# Patient Record
Sex: MALE | Race: WHITE | NOT HISPANIC OR LATINO | Employment: OTHER | URBAN - METROPOLITAN AREA
[De-identification: names, ages, dates, MRNs, and addresses within clinical notes are randomized per-mention and may not be internally consistent; named-entity substitution may affect disease eponyms.]

---

## 2018-09-30 ENCOUNTER — HOSPITAL ENCOUNTER (OUTPATIENT)
Dept: URGENT CARE | Facility: CLINIC | Age: 63
Discharge: HOME | End: 2018-09-30
Attending: EMERGENCY MEDICINE | Admitting: EMERGENCY MEDICINE

## 2018-09-30 ENCOUNTER — CONVERSION ENCOUNTER (OUTPATIENT)
Dept: URGENT CARE | Facility: CLINIC | Age: 63
End: 2018-09-30

## 2019-06-03 VITALS
BODY MASS INDEX: 33.99 KG/M2 | SYSTOLIC BLOOD PRESSURE: 161 MMHG | DIASTOLIC BLOOD PRESSURE: 93 MMHG | WEIGHT: 237.4 LBS | HEIGHT: 70 IN | HEART RATE: 60 BPM | RESPIRATION RATE: 2 BRPM | OXYGEN SATURATION: 98 %

## 2019-11-11 ENCOUNTER — HOSPITAL ENCOUNTER (EMERGENCY)
Facility: HOSPITAL | Age: 64
Discharge: HOME | End: 2019-11-11
Attending: EMERGENCY MEDICINE | Admitting: EMERGENCY MEDICINE

## 2019-11-11 VITALS
BODY MASS INDEX: 33.05 KG/M2 | SYSTOLIC BLOOD PRESSURE: 145 MMHG | RESPIRATION RATE: 16 BRPM | WEIGHT: 230.82 LBS | HEIGHT: 70 IN | TEMPERATURE: 98.1 F | OXYGEN SATURATION: 100 % | DIASTOLIC BLOOD PRESSURE: 85 MMHG | HEART RATE: 60 BPM

## 2019-11-11 DIAGNOSIS — L03.116 LEFT LEG CELLULITIS: Primary | ICD-10-CM

## 2019-11-11 LAB
ANION GAP SERPL CALCULATED.3IONS-SCNC: 9 MMOL/L (ref 5–15)
BASOPHILS # BLD AUTO: 0 10*3/MM3 (ref 0–0.2)
BASOPHILS NFR BLD AUTO: 0.6 % (ref 0–1.5)
BUN BLD-MCNC: 19 MG/DL (ref 8–23)
BUN/CREAT SERPL: 16.5 (ref 7–25)
CALCIUM SPEC-SCNC: 8.5 MG/DL (ref 8.6–10.5)
CHLORIDE SERPL-SCNC: 103 MMOL/L (ref 98–107)
CO2 SERPL-SCNC: 30 MMOL/L (ref 22–29)
CREAT BLD-MCNC: 1.15 MG/DL (ref 0.76–1.27)
CRP SERPL-MCNC: 1.55 MG/DL (ref 0–0.5)
D DIMER PPP FEU-MCNC: 0.37 MCGFEU/ML (ref 0.17–0.59)
DEPRECATED RDW RBC AUTO: 51.6 FL (ref 37–54)
EOSINOPHIL # BLD AUTO: 0.1 10*3/MM3 (ref 0–0.4)
EOSINOPHIL NFR BLD AUTO: 1.2 % (ref 0.3–6.2)
ERYTHROCYTE [DISTWIDTH] IN BLOOD BY AUTOMATED COUNT: 13.9 % (ref 12.3–15.4)
ERYTHROCYTE [SEDIMENTATION RATE] IN BLOOD: 14 MM/HR (ref 0–20)
GFR SERPL CREATININE-BSD FRML MDRD: 64 ML/MIN/1.73
GLUCOSE BLD-MCNC: 116 MG/DL (ref 65–99)
HCT VFR BLD AUTO: 39 % (ref 37.5–51)
HGB BLD-MCNC: 13.8 G/DL (ref 13–17.7)
HOLD SPECIMEN: NORMAL
LYMPHOCYTES # BLD AUTO: 2.4 10*3/MM3 (ref 0.7–3.1)
LYMPHOCYTES NFR BLD AUTO: 39.4 % (ref 19.6–45.3)
MCH RBC QN AUTO: 35.8 PG (ref 26.6–33)
MCHC RBC AUTO-ENTMCNC: 35.4 G/DL (ref 31.5–35.7)
MCV RBC AUTO: 101.1 FL (ref 79–97)
MONOCYTES # BLD AUTO: 0.7 10*3/MM3 (ref 0.1–0.9)
MONOCYTES NFR BLD AUTO: 11.5 % (ref 5–12)
NEUTROPHILS # BLD AUTO: 2.9 10*3/MM3 (ref 1.7–7)
NEUTROPHILS NFR BLD AUTO: 47.3 % (ref 42.7–76)
NRBC BLD AUTO-RTO: 0 /100 WBC (ref 0–0.2)
PLATELET # BLD AUTO: 150 10*3/MM3 (ref 140–450)
PMV BLD AUTO: 8.7 FL (ref 6–12)
POTASSIUM BLD-SCNC: 4.5 MMOL/L (ref 3.5–5.2)
RBC # BLD AUTO: 3.86 10*6/MM3 (ref 4.14–5.8)
SODIUM BLD-SCNC: 142 MMOL/L (ref 136–145)
WBC NRBC COR # BLD: 6 10*3/MM3 (ref 3.4–10.8)

## 2019-11-11 PROCEDURE — 80048 BASIC METABOLIC PNL TOTAL CA: CPT | Performed by: EMERGENCY MEDICINE

## 2019-11-11 PROCEDURE — 25010000002 CEFTRIAXONE PER 250 MG: Performed by: EMERGENCY MEDICINE

## 2019-11-11 PROCEDURE — 99283 EMERGENCY DEPT VISIT LOW MDM: CPT

## 2019-11-11 PROCEDURE — 86140 C-REACTIVE PROTEIN: CPT | Performed by: EMERGENCY MEDICINE

## 2019-11-11 PROCEDURE — 96374 THER/PROPH/DIAG INJ IV PUSH: CPT

## 2019-11-11 PROCEDURE — 85025 COMPLETE CBC W/AUTO DIFF WBC: CPT | Performed by: EMERGENCY MEDICINE

## 2019-11-11 PROCEDURE — 85379 FIBRIN DEGRADATION QUANT: CPT | Performed by: EMERGENCY MEDICINE

## 2019-11-11 PROCEDURE — 85652 RBC SED RATE AUTOMATED: CPT | Performed by: EMERGENCY MEDICINE

## 2019-11-11 RX ORDER — ATORVASTATIN CALCIUM 20 MG/1
10 TABLET, FILM COATED ORAL DAILY
COMMUNITY
Start: 2019-08-23

## 2019-11-11 RX ORDER — LISINOPRIL 10 MG/1
10 TABLET ORAL DAILY
COMMUNITY
Start: 2019-08-23

## 2019-11-11 RX ORDER — SILDENAFIL 100 MG/1
100 TABLET, FILM COATED ORAL
COMMUNITY
Start: 2019-08-23

## 2019-11-11 RX ORDER — CEPHALEXIN 500 MG/1
500 CAPSULE ORAL 4 TIMES DAILY
Qty: 40 CAPSULE | Refills: 0 | Status: SHIPPED | OUTPATIENT
Start: 2019-11-11

## 2019-11-11 RX ORDER — ASPIRIN 81 MG/1
81 TABLET ORAL DAILY
COMMUNITY
Start: 2015-04-21

## 2019-11-11 RX ORDER — METOPROLOL SUCCINATE 100 MG/1
100 TABLET, EXTENDED RELEASE ORAL DAILY
COMMUNITY
Start: 2019-08-23

## 2019-11-11 RX ORDER — SODIUM CHLORIDE 0.9 % (FLUSH) 0.9 %
10 SYRINGE (ML) INJECTION AS NEEDED
Status: DISCONTINUED | OUTPATIENT
Start: 2019-11-11 | End: 2019-11-12 | Stop reason: HOSPADM

## 2019-11-11 RX ADMIN — CEFTRIAXONE SODIUM 1 G: 10 INJECTION, POWDER, FOR SOLUTION INTRAVENOUS at 23:34

## 2019-11-12 NOTE — ED NOTES
Patient presents to ED with complaints of LLE edema with taut edema noted in calf. Complaints of pain in left knee. Recent international travel. Returned home mid October.      Dayanna Shelton RN  11/11/19 4744

## 2019-11-12 NOTE — ED PROVIDER NOTES
Subjective   History of Present Illness  Left leg pain  64-year-old male planes of pain redness on the anterior aspect of his left leg that started in the area of his tibial tuberosity and progressed involving the entire lower leg.  He states he had a similar issue a month ago when he was overseas that resolved spontaneously but this time it became more painful and swollen.  He reports no chest pain or shortness of breath or fevers or chills or vomiting.  He reports no trauma  Review of Systems   Constitutional: Negative.    HENT: Negative.    Respiratory: Negative.    Cardiovascular: Negative.    Gastrointestinal: Negative.    Genitourinary: Negative.    Musculoskeletal: Negative.    Skin: Positive for color change.   Neurological: Negative.    Hematological: Negative.    Psychiatric/Behavioral: Negative.        Past Medical History:   Diagnosis Date   • Acute ixjzf-xwdnty-zjtk disease (CMS/HCC)    • Cancer (CMS/HCC)    • H/O acute respiratory failure    • Hepatitis    • Hypertension    • Immune deficiency disorder (CMS/HCC)    • Kidney stone    • Macular degeneration    • Multi-organ failure with heart failure (CMS/HCC)     history   • Non Hodgkin's lymphoma (CMS/HCC)    • Radiation necrosis of skin and subcutaneous        No Known Allergies    Past Surgical History:   Procedure Laterality Date   • BONE MARROW TRANSPLANT     • CHOLECYSTECTOMY     • KNEE ASPIRATION     • SMALL INTESTINE SURGERY     • TOTAL SHOULDER REPLACEMENT         History reviewed. No pertinent family history.    Social History     Socioeconomic History   • Marital status:      Spouse name: Not on file   • Number of children: Not on file   • Years of education: Not on file   • Highest education level: Not on file   Tobacco Use   • Smoking status: Never Smoker   • Smokeless tobacco: Never Used   Substance and Sexual Activity   • Alcohol use: Yes     Comment: daily   • Drug use: No   • Sexual activity: Defer           Objective   Physical  "Exam  /93   Pulse 54   Temp 98.4 °F (36.9 °C)   Resp 16   Ht 177.8 cm (70\")   Wt 105 kg (230 lb 13.2 oz)   SpO2 100%   BMI 33.12 kg/m²   General: Well-developed well-appearing, no acute distress, alert and appropriate  Eyes: Pupils round and reactive, sclera nonicteric  HEENT: Mucous membranes moist, no mucosal swelling  Neck: Supple, no nuchal rigidity, no lymphadenopathy  Respirations: Respirations nonlabored, equal breath sounds bilaterally, clear lungs  Heart regular rate and rhythm, no murmurs rubs or gallops,   Abdomen soft nontender nondistended, no hepatosplenomegaly, no hernia, no mass, normal bowel sounds, no CVA tenderness  Extremities there is some erythema and localized tenderness over the anterior aspect of the leg, there is no fluctuance or induration, the knee and ankle joints are not involved, there is no knee joint effusion he has normal pulses and sensorimotor function distally, the posterior calf is nontender  Neuro cranial nerves grossly intact, no focal limb deficits  Psych oriented, pleasant affect  Skin no rash, brisk cap refill  Procedures           ED Course      Results for orders placed or performed during the hospital encounter of 11/11/19   Basic Metabolic Panel   Result Value Ref Range    Glucose 116 (H) 65 - 99 mg/dL    BUN 19 8 - 23 mg/dL    Creatinine 1.15 0.76 - 1.27 mg/dL    Sodium 142 136 - 145 mmol/L    Potassium 4.5 3.5 - 5.2 mmol/L    Chloride 103 98 - 107 mmol/L    CO2 30.0 (H) 22.0 - 29.0 mmol/L    Calcium 8.5 (L) 8.6 - 10.5 mg/dL    eGFR Non African Amer 64 >60 mL/min/1.73    BUN/Creatinine Ratio 16.5 7.0 - 25.0    Anion Gap 9.0 5.0 - 15.0 mmol/L   D-dimer, Quantitative   Result Value Ref Range    D-Dimer, Quantitative 0.37 0.17 - 0.59 MCGFEU/mL   C-reactive Protein   Result Value Ref Range    C-Reactive Protein 1.55 (H) 0.00 - 0.50 mg/dL   CBC Auto Differential   Result Value Ref Range    WBC 6.00 3.40 - 10.80 10*3/mm3    RBC 3.86 (L) 4.14 - 5.80 10*6/mm3    " Hemoglobin 13.8 13.0 - 17.7 g/dL    Hematocrit 39.0 37.5 - 51.0 %    .1 (H) 79.0 - 97.0 fL    MCH 35.8 (H) 26.6 - 33.0 pg    MCHC 35.4 31.5 - 35.7 g/dL    RDW 13.9 12.3 - 15.4 %    RDW-SD 51.6 37.0 - 54.0 fl    MPV 8.7 6.0 - 12.0 fL    Platelets 150 140 - 450 10*3/mm3    Neutrophil % 47.3 42.7 - 76.0 %    Lymphocyte % 39.4 19.6 - 45.3 %    Monocyte % 11.5 5.0 - 12.0 %    Eosinophil % 1.2 0.3 - 6.2 %    Basophil % 0.6 0.0 - 1.5 %    Neutrophils, Absolute 2.90 1.70 - 7.00 10*3/mm3    Lymphocytes, Absolute 2.40 0.70 - 3.10 10*3/mm3    Monocytes, Absolute 0.70 0.10 - 0.90 10*3/mm3    Eosinophils, Absolute 0.10 0.00 - 0.40 10*3/mm3    Basophils, Absolute 0.00 0.00 - 0.20 10*3/mm3    nRBC 0.0 0.0 - 0.2 /100 WBC                 MDM  Patient has findings of left leg cellulitis.  He has not been on any antibiotics and was given IV Rocephin in the emergency room course.  He is afebrile he is not septic.  He has no leukocytosis.  D-dimer screen is normal.  He was ordered Keflex and discharged to follow-up with his doctor over the next couple of days they were given warning signs for return and discharge good condition.  Final diagnoses:   Left leg cellulitis              Christian Cook MD  11/11/19 2260

## 2023-05-26 ENCOUNTER — APPOINTMENT (OUTPATIENT)
Dept: CT IMAGING | Facility: HOSPITAL | Age: 68
End: 2023-05-26
Payer: MEDICARE

## 2023-05-26 ENCOUNTER — HOSPITAL ENCOUNTER (OUTPATIENT)
Facility: HOSPITAL | Age: 68
Setting detail: OBSERVATION
Discharge: HOME | End: 2023-05-28
Attending: EMERGENCY MEDICINE | Admitting: STUDENT IN AN ORGANIZED HEALTH CARE EDUCATION/TRAINING PROGRAM
Payer: MEDICARE

## 2023-05-26 ENCOUNTER — APPOINTMENT (OUTPATIENT)
Dept: GENERAL RADIOLOGY | Facility: HOSPITAL | Age: 68
End: 2023-05-26
Payer: MEDICARE

## 2023-05-26 DIAGNOSIS — I71.21 AORTIC ROOT ANEURYSM: ICD-10-CM

## 2023-05-26 DIAGNOSIS — G45.9 TRANSIENT ISCHEMIC ATTACK: Primary | ICD-10-CM

## 2023-05-26 LAB
ABO GROUP BLD: NORMAL
ALBUMIN SERPL-MCNC: 4.1 G/DL (ref 3.5–5.2)
ALBUMIN/GLOB SERPL: 1.6 G/DL
ALP SERPL-CCNC: 87 U/L (ref 39–117)
ALT SERPL W P-5'-P-CCNC: 21 U/L (ref 1–41)
AMMONIA BLD-SCNC: 18 UMOL/L (ref 16–60)
ANION GAP SERPL CALCULATED.3IONS-SCNC: 12 MMOL/L (ref 5–15)
APTT PPP: 26.1 SECONDS (ref 24–31)
AST SERPL-CCNC: 36 U/L (ref 1–40)
BASOPHILS # BLD AUTO: 0 10*3/MM3 (ref 0–0.2)
BASOPHILS NFR BLD AUTO: 0.5 % (ref 0–1.5)
BILIRUB SERPL-MCNC: 0.6 MG/DL (ref 0–1.2)
BLD GP AB SCN SERPL QL: NEGATIVE
BUN SERPL-MCNC: 22 MG/DL (ref 8–23)
BUN/CREAT SERPL: 18.3 (ref 7–25)
CALCIUM SPEC-SCNC: 8.8 MG/DL (ref 8.6–10.5)
CHLORIDE SERPL-SCNC: 104 MMOL/L (ref 98–107)
CO2 SERPL-SCNC: 24 MMOL/L (ref 22–29)
CREAT SERPL-MCNC: 1.2 MG/DL (ref 0.76–1.27)
DEPRECATED RDW RBC AUTO: 50.8 FL (ref 37–54)
EGFRCR SERPLBLD CKD-EPI 2021: 65.9 ML/MIN/1.73
EOSINOPHIL # BLD AUTO: 0.1 10*3/MM3 (ref 0–0.4)
EOSINOPHIL NFR BLD AUTO: 1.1 % (ref 0.3–6.2)
ERYTHROCYTE [DISTWIDTH] IN BLOOD BY AUTOMATED COUNT: 14.2 % (ref 12.3–15.4)
ETHANOL UR QL: 0.05 %
GLOBULIN UR ELPH-MCNC: 2.5 GM/DL
GLUCOSE BLDC GLUCOMTR-MCNC: 151 MG/DL (ref 70–105)
GLUCOSE SERPL-MCNC: 86 MG/DL (ref 65–99)
HCT VFR BLD AUTO: 46.4 % (ref 37.5–51)
HGB BLD-MCNC: 15.1 G/DL (ref 13–17.7)
HOLD SPECIMEN: NORMAL
INR PPP: 1 (ref 0.93–1.1)
LYMPHOCYTES # BLD AUTO: 2.6 10*3/MM3 (ref 0.7–3.1)
LYMPHOCYTES NFR BLD AUTO: 39 % (ref 19.6–45.3)
MCH RBC QN AUTO: 33.7 PG (ref 26.6–33)
MCHC RBC AUTO-ENTMCNC: 32.5 G/DL (ref 31.5–35.7)
MCV RBC AUTO: 103.5 FL (ref 79–97)
MONOCYTES # BLD AUTO: 0.6 10*3/MM3 (ref 0.1–0.9)
MONOCYTES NFR BLD AUTO: 9.3 % (ref 5–12)
NEUTROPHILS NFR BLD AUTO: 3.3 10*3/MM3 (ref 1.7–7)
NEUTROPHILS NFR BLD AUTO: 50.1 % (ref 42.7–76)
NRBC BLD AUTO-RTO: 0.1 /100 WBC (ref 0–0.2)
PLATELET # BLD AUTO: 148 10*3/MM3 (ref 140–450)
PMV BLD AUTO: 8.2 FL (ref 6–12)
POTASSIUM SERPL-SCNC: 4.1 MMOL/L (ref 3.5–5.2)
PROT SERPL-MCNC: 6.6 G/DL (ref 6–8.5)
PROTHROMBIN TIME: 10.7 SECONDS (ref 9.6–11.7)
RBC # BLD AUTO: 4.48 10*6/MM3 (ref 4.14–5.8)
RH BLD: POSITIVE
SODIUM SERPL-SCNC: 140 MMOL/L (ref 136–145)
T&S EXPIRATION DATE: NORMAL
TROPONIN T SERPL HS-MCNC: 25 NG/L
WBC NRBC COR # BLD: 6.6 10*3/MM3 (ref 3.4–10.8)
WHOLE BLOOD HOLD COAG: NORMAL
WHOLE BLOOD HOLD SPECIMEN: NORMAL

## 2023-05-26 PROCEDURE — 86850 RBC ANTIBODY SCREEN: CPT | Performed by: EMERGENCY MEDICINE

## 2023-05-26 PROCEDURE — G0378 HOSPITAL OBSERVATION PER HR: HCPCS

## 2023-05-26 PROCEDURE — 25010000002 THIAMINE PER 100 MG: Performed by: EMERGENCY MEDICINE

## 2023-05-26 PROCEDURE — 86900 BLOOD TYPING SEROLOGIC ABO: CPT | Performed by: EMERGENCY MEDICINE

## 2023-05-26 PROCEDURE — 82140 ASSAY OF AMMONIA: CPT | Performed by: EMERGENCY MEDICINE

## 2023-05-26 PROCEDURE — 96374 THER/PROPH/DIAG INJ IV PUSH: CPT

## 2023-05-26 PROCEDURE — 70496 CT ANGIOGRAPHY HEAD: CPT

## 2023-05-26 PROCEDURE — 36415 COLL VENOUS BLD VENIPUNCTURE: CPT | Performed by: EMERGENCY MEDICINE

## 2023-05-26 PROCEDURE — 86901 BLOOD TYPING SEROLOGIC RH(D): CPT | Performed by: EMERGENCY MEDICINE

## 2023-05-26 PROCEDURE — 25510000001 IOPAMIDOL PER 1 ML: Performed by: EMERGENCY MEDICINE

## 2023-05-26 PROCEDURE — 93005 ELECTROCARDIOGRAM TRACING: CPT | Performed by: EMERGENCY MEDICINE

## 2023-05-26 PROCEDURE — 82077 ASSAY SPEC XCP UR&BREATH IA: CPT | Performed by: EMERGENCY MEDICINE

## 2023-05-26 PROCEDURE — 82948 REAGENT STRIP/BLOOD GLUCOSE: CPT

## 2023-05-26 PROCEDURE — 71045 X-RAY EXAM CHEST 1 VIEW: CPT

## 2023-05-26 PROCEDURE — 70498 CT ANGIOGRAPHY NECK: CPT

## 2023-05-26 PROCEDURE — 86901 BLOOD TYPING SEROLOGIC RH(D): CPT

## 2023-05-26 PROCEDURE — 84484 ASSAY OF TROPONIN QUANT: CPT | Performed by: EMERGENCY MEDICINE

## 2023-05-26 PROCEDURE — 0042T HC CT CEREBRAL PERFUSION W/WO CONTRAST: CPT

## 2023-05-26 PROCEDURE — 70450 CT HEAD/BRAIN W/O DYE: CPT

## 2023-05-26 PROCEDURE — 85610 PROTHROMBIN TIME: CPT | Performed by: EMERGENCY MEDICINE

## 2023-05-26 PROCEDURE — 86900 BLOOD TYPING SEROLOGIC ABO: CPT

## 2023-05-26 PROCEDURE — 99285 EMERGENCY DEPT VISIT HI MDM: CPT

## 2023-05-26 PROCEDURE — 85730 THROMBOPLASTIN TIME PARTIAL: CPT | Performed by: EMERGENCY MEDICINE

## 2023-05-26 PROCEDURE — 80053 COMPREHEN METABOLIC PANEL: CPT | Performed by: EMERGENCY MEDICINE

## 2023-05-26 PROCEDURE — 85025 COMPLETE CBC W/AUTO DIFF WBC: CPT | Performed by: EMERGENCY MEDICINE

## 2023-05-26 RX ORDER — ACETAMINOPHEN 325 MG/1
650 TABLET ORAL EVERY 4 HOURS PRN
Status: DISCONTINUED | OUTPATIENT
Start: 2023-05-26 | End: 2023-05-28 | Stop reason: HOSPADM

## 2023-05-26 RX ORDER — LISINOPRIL 2.5 MG/1
1 TABLET ORAL DAILY
Status: ON HOLD | COMMUNITY
Start: 2013-12-21 | End: 2023-05-26

## 2023-05-26 RX ORDER — BISACODYL 10 MG
10 SUPPOSITORY, RECTAL RECTAL DAILY PRN
Status: DISCONTINUED | OUTPATIENT
Start: 2023-05-26 | End: 2023-05-28 | Stop reason: HOSPADM

## 2023-05-26 RX ORDER — POLYETHYLENE GLYCOL 3350 17 G/17G
17 POWDER, FOR SOLUTION ORAL DAILY PRN
Status: DISCONTINUED | OUTPATIENT
Start: 2023-05-26 | End: 2023-05-28 | Stop reason: HOSPADM

## 2023-05-26 RX ORDER — ASPIRIN 325 MG
325 TABLET ORAL ONCE
Status: COMPLETED | OUTPATIENT
Start: 2023-05-26 | End: 2023-05-26

## 2023-05-26 RX ORDER — SODIUM CHLORIDE 0.9 % (FLUSH) 0.9 %
10 SYRINGE (ML) INJECTION AS NEEDED
Status: DISCONTINUED | OUTPATIENT
Start: 2023-05-26 | End: 2023-05-28 | Stop reason: HOSPADM

## 2023-05-26 RX ORDER — SODIUM CHLORIDE 9 MG/ML
40 INJECTION, SOLUTION INTRAVENOUS AS NEEDED
Status: DISCONTINUED | OUTPATIENT
Start: 2023-05-26 | End: 2023-05-28 | Stop reason: HOSPADM

## 2023-05-26 RX ORDER — ENOXAPARIN SODIUM 100 MG/ML
40 INJECTION SUBCUTANEOUS ONCE
Status: COMPLETED | OUTPATIENT
Start: 2023-05-26 | End: 2023-05-27

## 2023-05-26 RX ORDER — SODIUM CHLORIDE 0.9 % (FLUSH) 0.9 %
10 SYRINGE (ML) INJECTION EVERY 12 HOURS SCHEDULED
Status: DISCONTINUED | OUTPATIENT
Start: 2023-05-27 | End: 2023-05-28 | Stop reason: HOSPADM

## 2023-05-26 RX ORDER — AMOXICILLIN 250 MG
2 CAPSULE ORAL 2 TIMES DAILY
Status: DISCONTINUED | OUTPATIENT
Start: 2023-05-27 | End: 2023-05-28 | Stop reason: HOSPADM

## 2023-05-26 RX ORDER — THIAMINE HYDROCHLORIDE 100 MG/ML
100 INJECTION, SOLUTION INTRAMUSCULAR; INTRAVENOUS ONCE
Status: COMPLETED | OUTPATIENT
Start: 2023-05-26 | End: 2023-05-26

## 2023-05-26 RX ORDER — NITROGLYCERIN 0.4 MG/1
0.4 TABLET SUBLINGUAL
Status: DISCONTINUED | OUTPATIENT
Start: 2023-05-26 | End: 2023-05-28 | Stop reason: HOSPADM

## 2023-05-26 RX ORDER — ASPIRIN 81 MG/1
1 TABLET, CHEWABLE ORAL DAILY
COMMUNITY
Start: 2016-11-23

## 2023-05-26 RX ORDER — BISACODYL 5 MG/1
5 TABLET, DELAYED RELEASE ORAL DAILY PRN
Status: DISCONTINUED | OUTPATIENT
Start: 2023-05-26 | End: 2023-05-28 | Stop reason: HOSPADM

## 2023-05-26 RX ADMIN — IOPAMIDOL 150 ML: 755 INJECTION, SOLUTION INTRAVENOUS at 21:24

## 2023-05-26 RX ADMIN — ASPIRIN 325 MG ORAL TABLET 325 MG: 325 PILL ORAL at 22:02

## 2023-05-26 RX ADMIN — THIAMINE HYDROCHLORIDE 100 MG: 100 INJECTION, SOLUTION INTRAMUSCULAR; INTRAVENOUS at 22:47

## 2023-05-26 RX ADMIN — SODIUM CHLORIDE 500 ML: 9 INJECTION, SOLUTION INTRAVENOUS at 22:47

## 2023-05-26 NOTE — Clinical Note
Level of Care: Telemetry [5]   Diagnosis: Transient ischemic attack [[id number]]   Admitting Physician: BALBIR ESPINOZA [[id number]]   Attending Physician: BALBIR ESPINOZA [[id number]]   Bed Request Comments: [facility]

## 2023-05-27 ENCOUNTER — APPOINTMENT (OUTPATIENT)
Dept: MRI IMAGING | Facility: HOSPITAL | Age: 68
End: 2023-05-27
Payer: MEDICARE

## 2023-05-27 ENCOUNTER — APPOINTMENT (OUTPATIENT)
Dept: CARDIOLOGY | Facility: HOSPITAL | Age: 68
End: 2023-05-27
Payer: MEDICARE

## 2023-05-27 PROBLEM — I35.0 MODERATE AORTIC VALVE STENOSIS: Chronic | Status: ACTIVE | Noted: 2023-05-27

## 2023-05-27 PROBLEM — R47.01 EXPRESSIVE APHASIA: Status: RESOLVED | Noted: 2023-05-27 | Resolved: 2023-05-27

## 2023-05-27 PROBLEM — E78.2 MIXED HYPERLIPIDEMIA: Chronic | Status: ACTIVE | Noted: 2023-05-27

## 2023-05-27 PROBLEM — Z94.81 HISTORY OF BONE MARROW TRANSPLANT: Chronic | Status: ACTIVE | Noted: 2023-05-27

## 2023-05-27 PROBLEM — R47.01 EXPRESSIVE APHASIA: Status: ACTIVE | Noted: 2023-05-27

## 2023-05-27 PROBLEM — Z85.72 HISTORY OF NON-HODGKIN'S LYMPHOMA: Chronic | Status: ACTIVE | Noted: 2023-05-27

## 2023-05-27 PROBLEM — Z87.442 HISTORY OF KIDNEY STONES: Chronic | Status: ACTIVE | Noted: 2023-05-27

## 2023-05-27 PROBLEM — I10 ESSENTIAL HYPERTENSION: Chronic | Status: ACTIVE | Noted: 2023-05-27

## 2023-05-27 PROBLEM — E66.9 OBESITY (BMI 30-39.9): Chronic | Status: ACTIVE | Noted: 2023-05-27

## 2023-05-27 PROBLEM — K76.0 FATTY LIVER: Chronic | Status: ACTIVE | Noted: 2023-05-27

## 2023-05-27 PROBLEM — I27.20 MILD PULMONARY HYPERTENSION: Chronic | Status: ACTIVE | Noted: 2023-05-27

## 2023-05-27 PROBLEM — Z86.2 HISTORY OF GRAFT VERSUS HOST DISEASE: Chronic | Status: ACTIVE | Noted: 2023-05-27

## 2023-05-27 LAB
BH CV ECHO MEAS - ACS: 1.7 CM
BH CV ECHO MEAS - AO MAX PG: 15.1 MMHG
BH CV ECHO MEAS - AO MEAN PG: 9 MMHG
BH CV ECHO MEAS - AO V2 MAX: 194 CM/SEC
BH CV ECHO MEAS - AO V2 VTI: 47.7 CM
BH CV ECHO MEAS - AVA(I,D): 1.67 CM2
BH CV ECHO MEAS - EDV(CUBED): 97.3 ML
BH CV ECHO MEAS - EDV(MOD-SP4): 144 ML
BH CV ECHO MEAS - EF(MOD-BP): 68 %
BH CV ECHO MEAS - EF(MOD-SP4): 68.7 %
BH CV ECHO MEAS - ESV(CUBED): 29.8 ML
BH CV ECHO MEAS - ESV(MOD-SP4): 45.1 ML
BH CV ECHO MEAS - FS: 32.6 %
BH CV ECHO MEAS - IVS/LVPW: 1 CM
BH CV ECHO MEAS - IVSD: 1.1 CM
BH CV ECHO MEAS - LA DIMENSION: 4.2 CM
BH CV ECHO MEAS - LAT PEAK E' VEL: 6.2 CM/SEC
BH CV ECHO MEAS - LV DIASTOLIC VOL/BSA (35-75): 64.9 CM2
BH CV ECHO MEAS - LV MASS(C)D: 181.2 GRAMS
BH CV ECHO MEAS - LV MAX PG: 2.8 MMHG
BH CV ECHO MEAS - LV MEAN PG: 2 MMHG
BH CV ECHO MEAS - LV SYSTOLIC VOL/BSA (12-30): 20.3 CM2
BH CV ECHO MEAS - LV V1 MAX: 83.8 CM/SEC
BH CV ECHO MEAS - LV V1 VTI: 20.9 CM
BH CV ECHO MEAS - LVIDD: 4.6 CM
BH CV ECHO MEAS - LVIDS: 3.1 CM
BH CV ECHO MEAS - LVOT AREA: 3.8 CM2
BH CV ECHO MEAS - LVOT DIAM: 2.2 CM
BH CV ECHO MEAS - LVPWD: 1.1 CM
BH CV ECHO MEAS - MED PEAK E' VEL: 4.1 CM/SEC
BH CV ECHO MEAS - MR MAX PG: 107.3 MMHG
BH CV ECHO MEAS - MR MAX VEL: 518 CM/SEC
BH CV ECHO MEAS - MV A MAX VEL: 61.8 CM/SEC
BH CV ECHO MEAS - MV DEC SLOPE: 252 CM/SEC2
BH CV ECHO MEAS - MV DEC TIME: 0.24 MSEC
BH CV ECHO MEAS - MV E MAX VEL: 75.5 CM/SEC
BH CV ECHO MEAS - MV E/A: 1.22
BH CV ECHO MEAS - MV MAX PG: 7.7 MMHG
BH CV ECHO MEAS - MV MEAN PG: 2 MMHG
BH CV ECHO MEAS - MV P1/2T: 163.9 MSEC
BH CV ECHO MEAS - MV V2 VTI: 44 CM
BH CV ECHO MEAS - MVA(P1/2T): 1.34 CM2
BH CV ECHO MEAS - MVA(VTI): 1.81 CM2
BH CV ECHO MEAS - PA ACC TIME: 0.1 SEC
BH CV ECHO MEAS - PA PR(ACCEL): 34.9 MMHG
BH CV ECHO MEAS - PA V2 MAX: 67.4 CM/SEC
BH CV ECHO MEAS - RAP SYSTOLE: 8 MMHG
BH CV ECHO MEAS - RV MAX PG: 0.69 MMHG
BH CV ECHO MEAS - RV V1 MAX: 41.5 CM/SEC
BH CV ECHO MEAS - RV V1 VTI: 10.7 CM
BH CV ECHO MEAS - RVDD: 4.9 CM
BH CV ECHO MEAS - RVSP: 43.8 MMHG
BH CV ECHO MEAS - SI(MOD-SP4): 44.6 ML/M2
BH CV ECHO MEAS - SV(LVOT): 79.4 ML
BH CV ECHO MEAS - SV(MOD-SP4): 98.9 ML
BH CV ECHO MEAS - TR MAX PG: 35.8 MMHG
BH CV ECHO MEAS - TR MAX VEL: 299 CM/SEC
BH CV ECHO MEASUREMENTS AVERAGE E/E' RATIO: 14.66
BH CV ECHO SHUNT ASSESSMENT PERFORMED (HIDDEN SCRIPTING): 1
BH CV XLRA - TDI S': 10.9 CM/SEC
CHOLEST SERPL-MCNC: 147 MG/DL (ref 0–200)
FOLATE SERPL-MCNC: 8.41 NG/ML (ref 4.78–24.2)
GEN 5 2HR TROPONIN T REFLEX: 25 NG/L
HBA1C MFR BLD: 6.2 % (ref 4.8–5.6)
HDLC SERPL-MCNC: 40 MG/DL (ref 40–60)
LDLC SERPL CALC-MCNC: 58 MG/DL (ref 0–100)
LDLC/HDLC SERPL: 1.09 {RATIO}
LEFT ATRIUM VOLUME INDEX: 27.8 ML/M2
MAXIMAL PREDICTED HEART RATE: 152 BPM
QT INTERVAL: 408 MS
SINUS: 3.3 CM
STJ: 3 CM
STRESS TARGET HR: 129 BPM
T4 FREE SERPL-MCNC: 0.92 NG/DL (ref 0.93–1.7)
TRIGL SERPL-MCNC: 317 MG/DL (ref 0–150)
TROPONIN T DELTA: -1 NG/L
TROPONIN T SERPL HS-MCNC: 26 NG/L
TSH SERPL DL<=0.05 MIU/L-ACNC: 3.77 UIU/ML (ref 0.27–4.2)
VIT B12 BLD-MCNC: 281 PG/ML (ref 211–946)
VLDLC SERPL-MCNC: 49 MG/DL (ref 5–40)
WHOLE BLOOD HOLD COAG: NORMAL
WHOLE BLOOD HOLD SPECIMEN: NORMAL

## 2023-05-27 PROCEDURE — 99223 1ST HOSP IP/OBS HIGH 75: CPT | Performed by: INTERNAL MEDICINE

## 2023-05-27 PROCEDURE — 93306 TTE W/DOPPLER COMPLETE: CPT | Performed by: INTERNAL MEDICINE

## 2023-05-27 PROCEDURE — G0378 HOSPITAL OBSERVATION PER HR: HCPCS

## 2023-05-27 PROCEDURE — 70551 MRI BRAIN STEM W/O DYE: CPT

## 2023-05-27 PROCEDURE — 84443 ASSAY THYROID STIM HORMONE: CPT | Performed by: PSYCHIATRY & NEUROLOGY

## 2023-05-27 PROCEDURE — 84439 ASSAY OF FREE THYROXINE: CPT | Performed by: PSYCHIATRY & NEUROLOGY

## 2023-05-27 PROCEDURE — 82746 ASSAY OF FOLIC ACID SERUM: CPT | Performed by: PSYCHIATRY & NEUROLOGY

## 2023-05-27 PROCEDURE — 84484 ASSAY OF TROPONIN QUANT: CPT | Performed by: NURSE PRACTITIONER

## 2023-05-27 PROCEDURE — 99214 OFFICE O/P EST MOD 30 MIN: CPT | Performed by: PSYCHIATRY & NEUROLOGY

## 2023-05-27 PROCEDURE — 82607 VITAMIN B-12: CPT | Performed by: PSYCHIATRY & NEUROLOGY

## 2023-05-27 PROCEDURE — 83036 HEMOGLOBIN GLYCOSYLATED A1C: CPT | Performed by: PSYCHIATRY & NEUROLOGY

## 2023-05-27 PROCEDURE — 96372 THER/PROPH/DIAG INJ SC/IM: CPT

## 2023-05-27 PROCEDURE — 25010000002 ENOXAPARIN PER 10 MG: Performed by: EMERGENCY MEDICINE

## 2023-05-27 PROCEDURE — 80061 LIPID PANEL: CPT | Performed by: PSYCHIATRY & NEUROLOGY

## 2023-05-27 PROCEDURE — 93306 TTE W/DOPPLER COMPLETE: CPT

## 2023-05-27 PROCEDURE — 25010000002 SULFUR HEXAFLUORIDE MICROSPH 60.7-25 MG RECONSTITUTED SUSPENSION: Performed by: FAMILY MEDICINE

## 2023-05-27 RX ORDER — LISINOPRIL 5 MG/1
10 TABLET ORAL DAILY
Status: DISCONTINUED | OUTPATIENT
Start: 2023-05-27 | End: 2023-05-28 | Stop reason: HOSPADM

## 2023-05-27 RX ORDER — ASPIRIN 81 MG/1
81 TABLET, CHEWABLE ORAL DAILY
Status: DISCONTINUED | OUTPATIENT
Start: 2023-05-27 | End: 2023-05-28 | Stop reason: HOSPADM

## 2023-05-27 RX ORDER — METOPROLOL SUCCINATE 50 MG/1
50 TABLET, EXTENDED RELEASE ORAL DAILY
Status: DISCONTINUED | OUTPATIENT
Start: 2023-05-28 | End: 2023-05-28 | Stop reason: HOSPADM

## 2023-05-27 RX ORDER — METOPROLOL SUCCINATE 50 MG/1
100 TABLET, EXTENDED RELEASE ORAL DAILY
Status: DISCONTINUED | OUTPATIENT
Start: 2023-05-27 | End: 2023-05-27

## 2023-05-27 RX ORDER — ATORVASTATIN CALCIUM 10 MG/1
10 TABLET, FILM COATED ORAL DAILY
Status: DISCONTINUED | OUTPATIENT
Start: 2023-05-27 | End: 2023-05-27

## 2023-05-27 RX ORDER — ATORVASTATIN CALCIUM 40 MG/1
40 TABLET, FILM COATED ORAL DAILY
Status: DISCONTINUED | OUTPATIENT
Start: 2023-05-28 | End: 2023-05-28 | Stop reason: HOSPADM

## 2023-05-27 RX ORDER — CLOPIDOGREL BISULFATE 75 MG/1
75 TABLET ORAL DAILY
Status: DISCONTINUED | OUTPATIENT
Start: 2023-05-27 | End: 2023-05-28 | Stop reason: HOSPADM

## 2023-05-27 RX ADMIN — ASPIRIN 81 MG CHEWABLE TABLET 81 MG: 81 TABLET CHEWABLE at 09:05

## 2023-05-27 RX ADMIN — Medication 10 ML: at 00:26

## 2023-05-27 RX ADMIN — Medication 10 ML: at 20:49

## 2023-05-27 RX ADMIN — METOPROLOL SUCCINATE 100 MG: 50 TABLET, EXTENDED RELEASE ORAL at 09:05

## 2023-05-27 RX ADMIN — ENOXAPARIN SODIUM 40 MG: 100 INJECTION SUBCUTANEOUS at 00:26

## 2023-05-27 RX ADMIN — Medication 10 ML: at 09:13

## 2023-05-27 RX ADMIN — LISINOPRIL 10 MG: 5 TABLET ORAL at 09:05

## 2023-05-27 RX ADMIN — SULFUR HEXAFLUORIDE 1 ML: KIT at 08:35

## 2023-05-27 RX ADMIN — ATORVASTATIN CALCIUM 10 MG: 10 TABLET, FILM COATED ORAL at 09:05

## 2023-05-27 RX ADMIN — CLOPIDOGREL BISULFATE 75 MG: 75 TABLET ORAL at 14:48

## 2023-05-27 RX ADMIN — SENNOSIDES AND DOCUSATE SODIUM 2 TABLET: 50; 8.6 TABLET ORAL at 09:05

## 2023-05-27 NOTE — PLAN OF CARE
Goal Outcome Evaluation:  Plan of Care Reviewed With: patient, spouse        Progress: no change  Outcome Evaluation: MRI showed infarct, echo done, cardiology consulted, waiting on [facility] bed

## 2023-05-27 NOTE — CONSULTS
Primary Care Provider: No ref. provider found     Consult requested by: Admitting ER team    Reason for Consultation: Neurological evaluation /code stroke  History taken from: patient chart family    Chief complaint: Speech difficulty       SUBJECTIVE:    History of present illness: Background per H&P: Juarez Christina is a 67 y.o. year old male who was evaluated in room ICU 2313 at [facility]    Source of information is the patient, the medical records and his wife was of great help and she is an RN I believe    This gentleman was in usual state of health and he was apparently talking to his neighbor and that neighbor is an RN and the patient experienced symptoms of aphasia so she got alarmed and told the patient's wife.  The wife was bringing him here and he had 2 episodes of word finding difficulty.  At times he was confused.  Very questionable if there was anything else with it like facial weakness or arm weakness etc.  He had 2 more episodes here.    Before that he was in his usual state of health and there is nothing suggesting that there was any abnormality or sickness or exposure.  He had 2 beer but very slowly and he is not really known to drink a lot or to use drugs or have withdrawal    No falls or injuries  No migraines    His CT head was okay  His CTA did not show any acute lesions but there was paucity of the vessels on the left side    CTP showed some abnormality showing tissue at risk in the left parietal lobe but obviously his NIH at that time was 0 and he was not intervention candidate because it was too distal.  His MRI shows a small stroke on the left side and there may be a very small area in the caudate region also and this 1 is deep white matter to words possible cortical junction    He is clinically doing very well  He does take aspirin and low-dose Lipitor    Stroke work-up in progress    Official reading of the MRI is pending and I went to see the patient but first he was an  MRI and then I went to see him an MRI and he had gone to the floor so I will try to talk to him and his family    He definitely would need cardiac work-up      As per admitting,  Juarez Christina is a 67 y.o. male with past medical history of hypertension, hyperlipidemia, non-Hodgkin's lymphoma status post bone marrow transplant, fatty liver and macular degeneration.  Who presented to [facility] on 5/26/2023 complaining of aphasia.     Patient was downtown and event called [address] when he had acute onset of aphasia.  Wife reports that lasted about 30 seconds.  Patient reports when it occurred he was sitting having a beer.  On the way to the hospital wife reports patient had at least 2 more episodes where he was nonsensical in speech.  When he got to the ED he had a couple more episodes where he was experiencing more expressive aphasia.  Patient reports he was not able to speak what he was thinking.  He reports during the episode he had mild dizziness but no other symptoms.  Denies any unilateral weakness, headache, vision changes, chest pain or shortness of breath.     In the ED work-up essentially negative.  Chest x-ray showed bibasilar opacities that may represent atelectasis or infiltrate.  And cardiomegaly.  CT head and neck showed no significant stenosis.  Ascending aortic root aneurysm measuring 4.2 cm in AP dimension.  CT perfusion without contrast showed no core infarct but findings suspicion for tissue at risk in left parietal lobe recommend MRI.  Patient will be admitted for follow-up MRI and echo and neurology to see.        As per ER team,  67-year-old male was at a concert when he had the onset of intermittent difficulty in speaking.  The patient did have some intermittent abnormal speech.  Patient's wife is a nurse reports that he had 1/32 period of a complete aphasia.  There was some inappropriate speech as well.  The patient reported no extremity abnormalities he states that he has  macular degeneration reports no change in visual fields.  He denies scotomata or lateralizing weakness or peripheral focal neurologic findings.  His wife reported no change in gait.  The patient denies a history of headaches or migraines.  He reports no current headache.  He denies neck pain or stiffness   Medical Decision Making  NIH remained 0 throughout the ER stay.  The patient will be admitted for a TIA evaluation.  The patient also received thiamine secondary to neurologic symptomatology.  The patient and his wife are agreeable with this plan of treatment incidentally noted was a aortic root aneurysm     Amount and/or Complexity of Data Reviewed  Independent Historian: spouse     Details: Spouse who is a nurse was able to provide significant data  Labs: ordered. Decision-making details documented in ED Course.  Radiology: ordered and independent interpretation performed.     Details: Case was discussed with radiologist  ECG/medicine tests: ordered.     Details: The patient had a first-degree AV block that was new since 2018.  The patient had a PAC and PVC noted.  Comparison EKG showed old inferior changes and sinus rhythm with occasional PAC.  The inferior changes were similar on both tracings  Discussion of management or test interpretation with external provider(s): Case was discussed with neurologist, hospitalist service, radiologist   Risk  OTC drugs.  Prescription drug management.  Decision regarding hospitalization.     Risk Details: Risk of progression to stroke     Critical Care  Total time providing critical care: 40 minutes (Please note that 40 minutes was spent with care and stabilization this patient for critical care time exclusive of any procedures performed)     The finding of parietal tissue at risk reported by the radiologist was discussed with Dr. Hernández as well.  There is no LVO that appeared amenable to therapy.  There was no core infarct.  The patient will need follow-up MRI in the  morning as well as echocardiogram        - Portions of the above HPI were copied from previous encounters and edited as appropriate. PMH as detailed below.     Review of Systems   No fever chills rigors or sweats  No weight issues  No sleep problems  HEENT:  No speech problem, vision changes, facial asymmetry or pain, or neck problem  Chest: No chest pain, clubbing, cyanosis, orthopnea palpitations  Pulmonary:  No shortness of air, cough or expectoration  Abdomen:  No swelling/tension, constipation,diarrhea or pain  No genitourinary symptoms  Extremity problems as discussed  No back problem  No psychotic issues  Neurologic issues as discussed  No hematologic, dermatologic or endocrine problems, he has history of cancer        PATIENT HISTORY:  Past Medical History:   Diagnosis Date   • Acute vkxjn-mqxorm-vjbe disease    • Cancer    • H/O acute respiratory failure    • Hepatitis    • Hypertension    • Immune deficiency disorder    • Kidney stone    • Macular degeneration    • Multi-organ failure with heart failure     history   • Non Hodgkin's lymphoma    • Radiation necrosis of skin and subcutaneous    ,   Past Surgical History:   Procedure Laterality Date   • BONE MARROW TRANSPLANT     • CHOLECYSTECTOMY     • KNEE ASPIRATION     • SMALL INTESTINE SURGERY     • TOTAL SHOULDER REPLACEMENT     , History reviewed. No pertinent family history.,   Social History     Tobacco Use   • Smoking status: Never   • Smokeless tobacco: Never   Vaping Use   • Vaping Use: Never used   Substance Use Topics   • Alcohol use: Yes     Alcohol/week: 10.0 standard drinks     Types: 10 Cans of beer per week     Comment: daily   • Drug use: No   ,   Prior to Admission medications    Medication Sig Start Date End Date Taking? Authorizing Provider   aspirin 81 MG chewable tablet Chew 1 tablet Daily. 11/23/16  Yes Johann Gómez MD   atorvastatin (LIPITOR) 20 MG tablet Take 10 mg by mouth Daily. 8/23/19   Johann Gómez MD    lisinopril (PRINIVIL,ZESTRIL) 10 MG tablet Take 10 mg by mouth Daily. 8/23/19   ProviderJohann MD   metoprolol succinate XL (TOPROL-XL) 100 MG 24 hr tablet Take 100 mg by mouth Daily. 8/23/19   Johann Gómez MD   sildenafil (VIAGRA) 100 MG tablet Take 100 mg by mouth. 8/23/19   Johann Gómez MD    Allergies:  Acetaminophen    Current Facility-Administered Medications   Medication Dose Route Frequency Provider Last Rate Last Admin   • acetaminophen (TYLENOL) tablet 650 mg  650 mg Oral Q4H PRN Shelby Cortez APRN       • aspirin chewable tablet 81 mg  81 mg Oral Daily Shelby Cortez APRN       • atorvastatin (LIPITOR) tablet 10 mg  10 mg Oral Daily Shelby Cortez APRN       • sennosides-docusate (PERICOLACE) 8.6-50 MG per tablet 2 tablet  2 tablet Oral BID Shelby Cortez APRN        And   • polyethylene glycol (MIRALAX) packet 17 g  17 g Oral Daily PRN Shelby Cortez APRN        And   • bisacodyl (DULCOLAX) EC tablet 5 mg  5 mg Oral Daily PRN Shelby Coretz APRN        And   • bisacodyl (DULCOLAX) suppository 10 mg  10 mg Rectal Daily PRN Shelby Cortez APRN       • lisinopril (PRINIVIL,ZESTRIL) tablet 10 mg  10 mg Oral Daily Shelby Cortez APRN       • metoprolol succinate XL (TOPROL-XL) 24 hr tablet 100 mg  100 mg Oral Daily Shelby Cortez APRN       • nitroglycerin (NITROSTAT) SL tablet 0.4 mg  0.4 mg Sublingual Q5 Min PRN Shelby Cortez APRN       • sodium chloride 0.9 % flush 10 mL  10 mL Intravenous PRN Peter Angela MD       • sodium chloride 0.9 % flush 10 mL  10 mL Intravenous Q12H Shelby Cortez APRN   10 mL at 05/27/23 0026   • sodium chloride 0.9 % flush 10 mL  10 mL Intravenous PRN Shelby Cortez APRN       • sodium chloride 0.9 % infusion 40 mL  40 mL Intravenous PRN Cortez, Shelby, APRN            ________________________________________________________        OBJECTIVE:  Upon today's exam, gentleman is resting comfortably in bed in no  acute distress     Neurologic Exam    The patient is awake and alert and oriented x3     Cranial nerve examination demonstrate:  Full fields of vision to confrontation  Pupils are round, reactive to light and accommodation and size of about 3 mm  No ptosis or nystagmus  Funduscopic examination was not successful  Eye movements are conjugate     Sensation on the face and scalp are normal  Muscles of mastication are normal and symmetric  Muscles of  facial expression are normal and symmetric  Hearing is intact bilaterally  Head turning and shoulder shrugs were unremarkable  Tongue was midline  I could not visualize her oropharynx or uvula     Motor examination:  Normal bulk, tone and strength was 5/5  No fasciculations     Sensory examination:  Intact for soft touch, pain and position sensation  Romberg was not evaluated     Reflexes:  0/4     Coordination:  Normal finger-to-nose to finger, rapid alternating movements and toe to finger     Gait:  Deferred     Toe signs:  Mute    ________________________________________________________   RESULTS REVIEW:    VITAL SIGNS:   Temp:  [97.5 °F (36.4 °C)-98.6 °F (37 °C)] 97.7 °F (36.5 °C)  Heart Rate:  [50-82] 50  Resp:  [16-18] 17  BP: (116-156)/(75-94) 149/83     LABS:      Lab 05/26/23 2115   WBC 6.60   HEMOGLOBIN 15.1   HEMATOCRIT 46.4   PLATELETS 148   NEUTROS ABS 3.30   LYMPHS ABS 2.60   MONOS ABS 0.60   EOS ABS 0.10   .5*   PROTIME 10.7   APTT 26.1         Lab 05/26/23 2115   SODIUM 140   POTASSIUM 4.1   CHLORIDE 104   CO2 24.0   ANION GAP 12.0   BUN 22   CREATININE 1.20   EGFR 65.9   GLUCOSE 86   CALCIUM 8.8         Lab 05/26/23 2115   TOTAL PROTEIN 6.6   ALBUMIN 4.1   GLOBULIN 2.5   ALT (SGPT) 21   AST (SGOT) 36   BILIRUBIN 0.6   ALK PHOS 87         Lab 05/27/23  0658 05/27/23  0456 05/26/23 2115   HSTROP T 25* 26* 25*   PROTIME  --   --  10.7   INR  --   --  1.00             Lab 05/26/23 2115   ABO TYPING A   RH TYPING Positive   ANTIBODY SCREEN Negative              Lab Results   Component Value Date    LDL 60 07/17/2019       IMAGING STUDIES:  CT Angiogram Neck    Result Date: 5/26/2023  1.No hemodynamically significant stenosis, large vessel cut or aneurysms in intracranial circulation 2.No hemodynamically significant stenosis, dissection or aneurysms in extracranial circulation. 3.Ascending aortic root aneurysm measuring 4.2 cm in AP dimension. Electronically Signed: Rinku Sexton  5/26/2023 9:45 PM EDT  Workstation ID: [id number]    XR Chest 1 View    Result Date: 5/26/2023  1.Bibasilar opacities may represent atelectasis or infiltrates. 2.Cardiomegaly. Electronically Signed: Rinku Sexton  5/26/2023 10:30 PM EDT  Workstation ID: [id number]    CT Head Without Contrast Stroke Protocol    Result Date: 5/26/2023  1.No acute intracranial hemorrhage. Calvarium is intact. Electronically Signed: Rinku Sexton  5/26/2023 9:22 PM EDT  Workstation ID: [id number]    CT Angiogram Head w AI Analysis of LVO    Result Date: 5/26/2023  1.No hemodynamically significant stenosis, large vessel cut or aneurysms in intracranial circulation 2.No hemodynamically significant stenosis, dissection or aneurysms in extracranial circulation. 3.Ascending aortic root aneurysm measuring 4.2 cm in AP dimension. Electronically Signed: Rinku Sexton  5/26/2023 9:45 PM EDT  Workstation ID: [id number]    CT CEREBRAL PERFUSION WITH & WITHOUT CONTRAST    Result Date: 5/26/2023  Impression: 1.No core infarct is identified. 2.Findings raising suspicion for tissue at risk in left parietal lobe. MRI can be obtained for further evaluation. Case discussed with Dr. TONY BURGER @ 5/26/2023 10:01 PM EDT. Electronically Signed: Rinku Sexton  5/26/2023 10:01 PM EDT  Workstation ID: [id number]      I reviewed the patient's new clinical results.    ________________________________________________________     PROBLEM LIST:    Transient ischemic attack            ASSESSMENT/PLAN:  New infarcts on the left side I can  definitely see a large 1 and there is questionable some diffusion change in the caudate region which may be an artifact  One of them seems deep but other could be at the cortical junction    Therefore make us do further work-up like cardiac evaluation including HANNA and event monitor or loop recorder    I will add Plavix and Lipitor    I will try to do deeper into the this history of cancer to see what else needs to be done and possible hematology and oncology screen    Modification of stroke risk factors:   - Blood pressure should be less than 130/80 outpatient, HbA1c less than 6.5, LDL less than 70; b12>500 and smoking cessation if applicable. We would be grateful if the primary team / primary care physician would keep a close watch on the above targets.  - Stroke education  - Follow up with neurologist of choice    I went back and reviewed the MRI with the patient, his wife and Dr. Ej Greene    Plan is to have cardiac work-up done and it can be done electively          I discussed the patient's findings and my recommendations with patient, family, nursing staff and primary care team    Angelica Dover MD  05/27/23  09:08 EDT

## 2023-05-27 NOTE — PROGRESS NOTES
[facility] Medicine Services   Daily Progress Note    Patient Name: Juarez Christina  : 1955  MRN: [id number]  Primary Care Physician:  Mame Starks MD  Date of admission: 2023    Subjective      Admitted in consultation with neurology for stroke evaluation after multiple acute aphasia episodes    All stroke symptoms have resolved.  Mild troponin elevation on admission.  His echocardiogram was completed with findings as noted.  Alcohol level was minimal on admission.  ER admission imaging reviewed.  Follow-up MRI was positive for a left MCA stroke.  Rounded at the bedside with neurology.  He remains on statin therapy as well as dual antiplatelet therapy.  Thyroid studies, lipid panel and A1c have not yet been completed.  Blood pressures have been reasonable, averaging 150 systolic.  Heart rate has been in the 60s, but he has been dipping into the 40s even while awake lying in bed.  He is on metoprolol at baseline.  No evidence of any other arrhythmias on telemetry thus far.  Wife is at the bedside.  Discussed the case with the bedside nursing staff. No other acute concerns.    Objective      Vitals:   Temp:  [97.5 °F (36.4 °C)-98.6 °F (37 °C)] 97.7 °F (36.5 °C)  Heart Rate:  [50-82] 64  Resp:  [16-18] 17  BP: (116-156)/(75-94) 148/84    Physical Exam   GEN: WDWN, no acute distress  HEENT: NCAT, PERRLA, moist mucous membranes  NECK: Supple, midline trachea  CARD: RRR, no appreciable M/R/G, no peripheral edema  PULM: Fairly CTAB, non-distressed  ABD: soft, NTND, normoactive bowel sounds throughout  NEURO: Grossly intact, non-focal exam  PSYCH: Pleasant     Result Review    I have personally reviewed the results from the time of this admission to 2023 10:15 EDT and agree with these findings:  [x]  Laboratory  [x]  Microbiology  [x]  Radiology  [x]  EKG/Telemetry   [x]  Cardiology/Vascular   []  Pathology  [x]  Old records  []  Other:    XR Chest 1 View    Result Date: 2023  XR  CHEST 1 VW Date of Exam: 5/26/2023 9:44 PM EDT Indication: Acute Stroke Protocol (onset < 12 hrs) Comparison: 9/30/2018 Findings: LUNGS: Bibasilar opacities. PNEUMOTHORAX: None.  HEART SIZE: Cardiomegaly.     Impression: 1.Bibasilar opacities may represent atelectasis or infiltrates. 2.Cardiomegaly. Electronically Signed: Rinku Ali  5/26/2023 10:30 PM EDT  Workstation ID: [id number]    CT Angiogram Head w AI Analysis of LVO [[id number]] Jeff as Reviewed   Order Status: Completed Collected: 05/26/23 2128    Updated: 05/26/23 2147   Narrative:     CT ANGIOGRAM HEAD W AI ANALYSIS OF LVO, CT ANGIOGRAM NECK     Date of Exam: 5/26/2023 9:13 PM EDT     Indication: Stroke, follow up   Neuro deficit, acute stroke suspected   Acute Stroke.     Comparison: CT head without contrast 5/26/2023     Technique: CTA of the head and neck was performed after the uneventful intravenous administration of iodinated contrast. Reconstructed coronal and sagittal images were also obtained. In addition, a 3-D volume rendered image was created for   interpretation. Automated exposure control and iterative reconstruction methods were used.       Findings:     CTA NECK:   *Aortic arch: No aneurysm. No significant stenosis or occlusion of the major arch vessels.   *Left carotid system: No aneurysm, significant stenosis or occlusion.   *Right carotid system: No aneurysm, significant stenosis or occlusion.   *Vertebrobasilar system: The vertebral arteries arise from their respective subclavian arteries. No aneurysm, significant stenosis or occlusion.     CTA HEAD:   *Anterior circulation: No aneurysm, significant stenosis or occlusion.   *Posterior circulation: No aneurysm, significant stenosis or occlusion.   *Anatomic variants: None of significance.   *Venous sinuses: Patent.    Impression:     1.No hemodynamically significant stenosis, large vessel cut or aneurysms in intracranial circulation   2.No hemodynamically significant stenosis,  dissection or aneurysms in extracranial circulation.   3.Ascending aortic root aneurysm measuring 4.2 cm in AP dimension.         Electronically Signed: Rinku Sexton    5/26/2023 9:45 PM EDT    Workstation ID: [id number]    CT Angiogram Neck [[id number]] Jeff as Reviewed   Order Status: Completed Collected: 05/26/23 2128    Updated: 05/26/23 2147   Narrative:     CT ANGIOGRAM HEAD W AI ANALYSIS OF LVO, CT ANGIOGRAM NECK     Date of Exam: 5/26/2023 9:13 PM EDT     Indication: Stroke, follow up   Neuro deficit, acute stroke suspected   Acute Stroke.     Comparison: CT head without contrast 5/26/2023     Technique: CTA of the head and neck was performed after the uneventful intravenous administration of iodinated contrast. Reconstructed coronal and sagittal images were also obtained. In addition, a 3-D volume rendered image was created for   interpretation. Automated exposure control and iterative reconstruction methods were used.       Findings:     CTA NECK:   *Aortic arch: No aneurysm. No significant stenosis or occlusion of the major arch vessels.   *Left carotid system: No aneurysm, significant stenosis or occlusion.   *Right carotid system: No aneurysm, significant stenosis or occlusion.   *Vertebrobasilar system: The vertebral arteries arise from their respective subclavian arteries. No aneurysm, significant stenosis or occlusion.     CTA HEAD:   *Anterior circulation: No aneurysm, significant stenosis or occlusion.   *Posterior circulation: No aneurysm, significant stenosis or occlusion.   *Anatomic variants: None of significance.   *Venous sinuses: Patent.    Impression:     1.No hemodynamically significant stenosis, large vessel cut or aneurysms in intracranial circulation   2.No hemodynamically significant stenosis, dissection or aneurysms in extracranial circulation.   3.Ascending aortic root aneurysm measuring 4.2 cm in AP dimension.         Electronically Signed: Rinku Sexton    5/26/2023 9:45 PM EDT     Workstation ID: [id number]    CT CEREBRAL PERFUSION WITH & WITHOUT CONTRAST [[id number]] Jeff as Reviewed   Order Status: Completed Collected: 05/26/23 2147    Updated: 05/26/23 2203   Narrative:     CT CEREBRAL PERFUSION W WO CONTRAST     Date of Exam: 5/26/2023 9:13 PM EDT     Indication: Neuro deficit, acute, stroke suspected.       Comparison: CT head and CTA head and neck 5/26/2023     Technique: Axial CT images of the brain were obtained prior to and after the administration of iodinated contrast. CT Perfusion protocol was utilized. Automated post processing was performed by RAPID software and submitted to PACS for interpretation.   Automated exposure control and iterative reconstruction was utilized.       Findings:   *CBF less than 30%: 0 mL. No definite core infarct is identified.   *Tmax greater than 6 seconds: 32 mL. This may represent tissue at risk in left parietal lobe.   *Mismatch volume with 32 mL.   *Mismatch ratio is infinite.    Impression:     Impression:   1.No core infarct is identified.   2.Findings raising suspicion for tissue at risk in left parietal lobe. MRI can be obtained for further evaluation.       Case discussed with Dr. TONY BURGER @ 5/26/2023 10:01 PM EDT.       Electronically Signed: Rinku Sexton    5/26/2023 10:01 PM EDT    Workstation ID: [id number]       CT Head Without Contrast Stroke Protocol    Result Date: 5/26/2023  CT HEAD WO CONTRAST STROKE PROTOCOL Date of Exam: 5/26/2023 9:05 PM EDT Indication: Neuro deficit, acute stroke suspected Neuro deficit, acute, stroke suspected. Comparison: None available. Technique: Axial CT images were obtained of the head without contrast administration.  Reconstructed coronal and sagittal images were also obtained. Automated exposure control and iterative construction methods were used. Scan Time: 2111 hours. Findings: *No acute intracranial hemorrhage. *No masses, mass effect, midline shift or hydrocephalus. *White matter is intact.  *Calvarium is intact. *Visualized orbits and globes are unremarkable without radiopaque foreign bodies. *Visualized paranasal sinuses are clear. *Visualized mastoid air cells are clear.     Impression: 1.No acute intracranial hemorrhage. Calvarium is intact. Electronically Signed: Rinku Carlie  5/26/2023 9:22 PM EDT  Workstation ID: [id number]    MRI Brain Without Contrast    Result Date: 5/27/2023  MRI BRAIN WO CONTRAST Date of Exam: 5/27/2023 11:14 AM EDT Indication: Stroke, follow up.  Comparison: CT head from May 26, 2023 Technique:  Routine multiplanar/multisequence sequence images of the brain were obtained without contrast administration. Findings: There is a small subacute infarct within the left insula, and tiny subacute infarcts within the left caudate head and left high frontal lobe. There is some petechial staining within the left caudate infarct, but no fadi hemorrhagic transformation. The ventricles are stable in caliber, with no midline shift. The basal cisterns appear patent. Subtle foci of periventricular and subcortical white matter FLAIR hyperintensities are nonspecific, but likely the sequela of minimal chronic small vessel ischemic  disease. The midline structures appear intact. The globes and orbits appear intact, with note of a right lens replacement. The intracranial vascular flow-voids appear patent.     Impression: Impression: 1.Small subacute infarct within left insula, and tiny subacute infarcts within left caudate head and left high frontal lobe. Petechial staining within left caudate infarct, but no fadi hemorrhagic transformation. 2.Findings suggestive of minimal chronic small vessel ischemic disease. Electronically Signed: Juventino Bell  5/27/2023 4:53 PM EDT  Workstation ID: [id number]      Results for orders placed during the hospital encounter of 05/26/23    Adult Transthoracic Echo Complete W/ Cont if Necessary Per Protocol    Interpretation Summary  •  Left ventricular systolic  function is normal. Calculated left ventricular EF = 68% Left ventricular ejection fraction appears to be 66 - 70%.  •  Left ventricular diastolic dysfunction is noted.  •  The right ventricular cavity is dilated.  •  The left atrial cavity is dilated.  •  Saline test results are negative for right to left atrial level shunt.  •  Moderate aortic valve stenosis is present.  •  Estimated right ventricular systolic pressure from tricuspid regurgitation is mildly elevated (35-45 mmHg).  •  Mild pulmonary hypertension is present.      Assessment & Plan      aspirin, 81 mg, Oral, Daily  atorvastatin, 10 mg, Oral, Daily  lisinopril, 10 mg, Oral, Daily  metoprolol succinate XL, 100 mg, Oral, Daily  senna-docusate sodium, 2 tablet, Oral, BID  sodium chloride, 10 mL, Intravenous, Q12H             Active Hospital Problems    Diagnosis  POA   • History of non-Hodgkin's lymphoma [Z85.72]  Not Applicable   • Essential hypertension [I10]  Yes   • Mixed hyperlipidemia [E78.2]  Yes   • Obesity (BMI 30-39.9) [E66.9]  Yes   • History of bone marrow transplant [Z94.81]  Not Applicable   • Fatty liver [K76.0]  Yes   • Moderate aortic valve stenosis [I35.0]  Yes   • Mild pulmonary hypertension [I27.20]  Yes   • 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  Current blood pressures are reasonable for now.  Continue dual antiplatelet therapy and moderate dose statin therapy.  Continue home medications for his chronic medical conditions otherwise.  Neurology following  Cardiology consulted for HANNA and likely need for outpatient loop recorder.  Difficult to say if this was due to arrhythmia, or possibly hypoperfusion due to aortic stenosis and bradycardia along with dehydration during this particular  episode.  Thyroid studies/lipid panel/A1c are pending.  PT/OT, cleared by speech therapy.    DVT prophylaxis:  Mechanical DVT prophylaxis orders are present.    CODE STATUS:    Code Status (Patient has no pulse and is not breathing): CPR (Attempt to Resuscitate)  Medical Interventions (Patient has pulse or is breathing): Full Support    Disposition:  I expect patient to be discharged within 24-48 hours.    Electronically signed by Ej Greene MD, 05/27/23, 10:15 EDT.  [facility] Hospitalist Team

## 2023-05-27 NOTE — ED PROVIDER NOTES
Subjective   History of Present Illness  67-year-old male was at a concert when he had the onset of intermittent difficulty in speaking.  The patient did have some intermittent abnormal speech.  Patient's wife is a nurse reports that he had 1/32 period of a complete aphasia.  There was some inappropriate speech as well.  The patient reported no extremity abnormalities he states that he has macular degeneration reports no change in visual fields.  He denies scotomata or lateralizing weakness or peripheral focal neurologic findings.  His wife reported no change in gait.  The patient denies a history of headaches or migraines.  He reports no current headache.  He denies neck pain or stiffness        Review of Systems   Constitutional: Negative for fatigue.   HENT: Negative for trouble swallowing.    Eyes: Positive for visual disturbance. Negative for pain.   Respiratory: Negative for shortness of breath.    Cardiovascular: Negative for chest pain.   Neurological: Positive for speech difficulty. Negative for tremors, seizures, syncope, facial asymmetry, weakness, light-headedness (.TOMPHYSICALEXAM), numbness and headaches.   Hematological: Does not bruise/bleed easily.       Past Medical History:   Diagnosis Date   • Acute dzevy-mvthvw-izvp disease    • Cancer    • H/O acute respiratory failure    • Hepatitis    • Hypertension    • Immune deficiency disorder    • Kidney stone    • Macular degeneration    • Multi-organ failure with heart failure     history   • Non Hodgkin's lymphoma    • Radiation necrosis of skin and subcutaneous        No Known Allergies    Past Surgical History:   Procedure Laterality Date   • BONE MARROW TRANSPLANT     • CHOLECYSTECTOMY     • KNEE ASPIRATION     • SMALL INTESTINE SURGERY     • TOTAL SHOULDER REPLACEMENT         No family history on file.    Social History     Socioeconomic History   • Marital status:    Tobacco Use   • Smoking status: Never   • Smokeless tobacco: Never    Substance and Sexual Activity   • Alcohol use: Yes     Comment: daily   • Drug use: No   • Sexual activity: Defer     Reports no history of drug use      Objective   Physical Exam  Alert Carlos Coma Scale 15 NIH of 0 on presentation   HEENT: Pupils equal and reactive to light. Conjunctivae are not injected. Normal tympanic membranes. Oropharynx and nares are normal.  No horizontal nystagmus   Neck: Supple. Midline trachea. No JVD. No goiter.   Chest: Clear and equal breath sounds bilaterally, regular rate and rhythm without murmur or rub.   Abdomen: Positive bowel sounds, nontender, nondistended. No rebound or peritoneal signs. No CVA tenderness.   Extremities/right-hand-dominant cranial nerves are grossly intact there is no evidence of inappropriate speech or word salad.  There is no extremity weakness or focal deficits identified.  Visual fields are intact.  Normal gait according to the wife to the car no clubbing. cyanosis or edema. Motor sensory exam is normal. The full range of motion is intact   Skin: Warm and dry, no rashes or petechia.   Lymphatic: No regional lymphadenopathy. No calf pain, swelling or Homans sign    Procedures           ED Course      Labs Reviewed   SINGLE HSTROPONIN T - Abnormal; Notable for the following components:       Result Value    HS Troponin T 25 (*)     All other components within normal limits    Narrative:     High Sensitive Troponin T Reference Range:  <10.0 ng/L- Negative Female for AMI  <15.0 ng/L- Negative Male for AMI  >=10 - Abnormal Female indicating possible myocardial injury.  >=15 - Abnormal Male indicating possible myocardial injury.   Clinicians would have to utilize clinical acumen, EKG, Troponin, and serial changes to determine if it is an Acute Myocardial Infarction or myocardial injury due to an underlying chronic condition.        CBC WITH AUTO DIFFERENTIAL - Abnormal; Notable for the following components:    .5 (*)     MCH 33.7 (*)     All other  components within normal limits   POCT GLUCOSE FINGERSTICK - Abnormal; Notable for the following components:    Glucose 151 (*)     All other components within normal limits   PROTIME-INR - Normal   APTT - Normal   AMMONIA - Normal   COMPREHENSIVE METABOLIC PANEL    Narrative:     GFR Normal >60  Chronic Kidney Disease <60  Kidney Failure <15     ETHANOL    Narrative:     Plasma Ethanol Clinical Symptoms:    ETOH (%)               Clinical Symptom  .01-.05              No apparent influence  .03-.12              Euphoria, Diminished judgment and attention   .09-.25              Impaired comprehension, Muscle incoordination  .18-.30              Confusion, Staggered gait, Slurred speech  .25-.40              Markedly decreased response to stimuli, unable to stand or                        walk, vomitting, sleep or stupor  .35-.50              Comatose, Anesthesia, Subnormal body temperature       RAINBOW DRAW    Narrative:     The following orders were created for panel order [place] Draw.  Procedure                               Abnormality         Status                     ---------                               -----------         ------                     Green Top (Gel)[[id number]]                                  In process                 Lavender Top[[id number]]                                     In process                 Gold Top - SST[[id number]]                                   Final result               Light Blue Top[[id number]]                                   In process                   Please view results for these tests on the individual orders.   POCT GLUCOSE FINGERSTICK   TYPE AND SCREEN   BB ARMBAND CHECK   CBC AND DIFFERENTIAL    Narrative:     The following orders were created for panel order CBC & Differential.  Procedure                               Abnormality         Status                     ---------                               -----------         ------                     CBC Auto  Differential[[id number]]        Abnormal            Final result                 Please view results for these tests on the individual orders.   GOLD TOP - SST   GREEN TOP   LAVENDER TOP   LIGHT BLUE TOP     Medications   sodium chloride 0.9 % flush 10 mL (has no administration in time range)   sodium chloride 0.9 % bolus 500 mL (has no administration in time range)   thiamine (B-1) injection 100 mg (has no administration in time range)   iopamidol (ISOVUE-370) 76 % injection 150 mL (150 mL Intravenous Given 5/26/23 2124)   aspirin tablet 325 mg (325 mg Oral Given 5/26/23 2202)     CT Angiogram Neck    Result Date: 5/26/2023  1.No hemodynamically significant stenosis, large vessel cut or aneurysms in intracranial circulation 2.No hemodynamically significant stenosis, dissection or aneurysms in extracranial circulation. 3.Ascending aortic root aneurysm measuring 4.2 cm in AP dimension. Electronically Signed: Rinku Sexton  5/26/2023 9:45 PM EDT  Workstation ID: [id number]    CT Head Without Contrast Stroke Protocol    Result Date: 5/26/2023  1.No acute intracranial hemorrhage. Calvarium is intact. Electronically Signed: Rinku Sexton  5/26/2023 9:22 PM EDT  Workstation ID: [id number]    CT Angiogram Head w AI Analysis of LVO    Result Date: 5/26/2023  1.No hemodynamically significant stenosis, large vessel cut or aneurysms in intracranial circulation 2.No hemodynamically significant stenosis, dissection or aneurysms in extracranial circulation. 3.Ascending aortic root aneurysm measuring 4.2 cm in AP dimension. Electronically Signed: Rinku Sexton  5/26/2023 9:45 PM EDT  Workstation ID: [id number]    CT CEREBRAL PERFUSION WITH & WITHOUT CONTRAST    Result Date: 5/26/2023  Impression: 1.No core infarct is identified. 2.Findings raising suspicion for tissue at risk in left parietal lobe. MRI can be obtained for further evaluation. Case discussed with Dr. TONY BURGER @ 5/26/2023 10:01 PM EDT. Electronically Signed: Rinku  Carlie  5/26/2023 10:01 PM EDT  Workstation ID: [id number]                                         Medical Decision Making  NIH remained 0 throughout the ER stay.  The patient will be admitted for a TIA evaluation.  The patient also received thiamine secondary to neurologic symptomatology.  The patient and his wife are agreeable with this plan of treatment incidentally noted was a aortic root aneurysm    Amount and/or Complexity of Data Reviewed  Independent Historian: spouse     Details: Spouse who is a nurse was able to provide significant data  Labs: ordered. Decision-making details documented in ED Course.  Radiology: ordered and independent interpretation performed.     Details: Case was discussed with radiologist  ECG/medicine tests: ordered.     Details: The patient had a first-degree AV block that was new since 2018.  The patient had a PAC and PVC noted.  Comparison EKG showed old inferior changes and sinus rhythm with occasional PAC.  The inferior changes were similar on both tracings  Discussion of management or test interpretation with external provider(s): Case was discussed with neurologist, hospitalist service, radiologist    Risk  OTC drugs.  Prescription drug management.  Decision regarding hospitalization.    Risk Details: Risk of progression to stroke    Critical Care  Total time providing critical care: 40 minutes (Please note that 40 minutes was spent with care and stabilization this patient for critical care time exclusive of any procedures performed)    The finding of parietal tissue at risk reported by the radiologist was discussed with Dr. Hernández as well.  There is no LVO that appeared amenable to therapy.  There was no core infarct.  The patient will need follow-up MRI in the morning as well as echocardiogram  Final diagnoses:   Transient ischemic attack   Aortic root aneurysm       ED Disposition  ED Disposition     ED Disposition   Decision to Admit    Condition   --    Comment   Level of  Care: Telemetry [5]   Diagnosis: Transient ischemic attack [214861]   Admitting Physician: BALBIR ESPINOZA [[id number]]   Attending Physician: BALBIR ESPINOZA [[id number]]   Bed Request Comments: ANTON               No follow-up provider specified.       Medication List      No changes were made to your prescriptions during this visit.          Peter Angela MD  05/26/23 9901

## 2023-05-27 NOTE — ED NOTES
Nursing report ED to floor  Juarez Christina  67 y.o.  male    HPI:   Chief Complaint   Patient presents with    Stroke       Admitting doctor:   Daysi Gonzalez DO    Admitting diagnosis:   The primary encounter diagnosis was Transient ischemic attack. A diagnosis of Aortic root aneurysm was also pertinent to this visit.    Code status:   Current Code Status       Date Active Code Status Order ID Comments User Context       Not on file            Allergies:   Patient has no known allergies.    Isolation:  No active isolations     Fall Risk:  Fall Risk Assessment was completed, and patient is at low risk for falls.   Predictive Model Details         7 (Low) Factor Value    Calculated 5/26/2023 22:06 Age 67    Risk of Fall Model Musculoskeletal Assessment WDL     Active Peripheral IV Present     Imaging order in this encounter Present     Respiratory Rate 18     Skin Assessment WDL     Magnesium not on file     Financial Class Medicare     Drug Use No     Gibran Scale not on file     Calcium 8.8 mg/dL     Number of Distinct Medication Classes administered 2     Peripheral Vascular Assessment WDL     Diastolic BP 81     Clinically Relevant Sex Not Female     Gastrointestinal Assessment WDL     Total Bilirubin 0.6 mg/dL     Cardiac Assessment WDL     Albumin 4.1 g/dL     Creatinine 1.2 mg/dL     ALT 21 U/L     Potassium 4.1 mmol/L     Days after Admission 0.041     Chloride 104 mmol/L         Weight:       05/26/23 2058   Weight: 106 kg (233 lb 11 oz)       Intake and Output  No intake or output data in the 24 hours ending 05/26/23 2344    Diet:   Dietary Orders (From admission, onward)       Start     Ordered    05/26/23 2107  NPO Diet NPO Type: Strict NPO  (Acute (Onset < 24 hrs) Stroke Order Panel - COR / DENY / LAG / MAHNAZ / MAD / PAD / NADEEM / FSED)  Diet Effective Now        Question:  NPO Type  Answer:  Strict NPO    05/26/23 2106                     Most recent vitals:   Vitals:    05/26/23 2058 05/26/23 2105  "05/26/23 2246 05/26/23 2336   BP: 156/81  151/83 154/94   Pulse: 82  64 66   Resp: 18      Temp: 98.6 °F (37 °C)      SpO2: 96%  96% 98%   Weight: 106 kg (233 lb 11 oz)      Height:  177.8 cm (70\")         Active LDAs/IV Access:   Lines, Drains & Airways       Active LDAs       Name Placement date Placement time Site Days    Peripheral IV 05/26/23 2117 Right Antecubital 05/26/23 2117  Antecubital  less than 1                    Skin Condition:   Skin Assessments (last day)       Date/Time Skin WDL Interval    05/26/23 21:17:48 -- baseline    05/26/23 21:25:18 WDL --             Labs (abnormal labs have a star):   Labs Reviewed   SINGLE HSTROPONIN T - Abnormal; Notable for the following components:       Result Value    HS Troponin T 25 (*)     All other components within normal limits    Narrative:     High Sensitive Troponin T Reference Range:  <10.0 ng/L- Negative Female for AMI  <15.0 ng/L- Negative Male for AMI  >=10 - Abnormal Female indicating possible myocardial injury.  >=15 - Abnormal Male indicating possible myocardial injury.   Clinicians would have to utilize clinical acumen, EKG, Troponin, and serial changes to determine if it is an Acute Myocardial Infarction or myocardial injury due to an underlying chronic condition.        CBC WITH AUTO DIFFERENTIAL - Abnormal; Notable for the following components:    .5 (*)     MCH 33.7 (*)     All other components within normal limits   POCT GLUCOSE FINGERSTICK - Abnormal; Notable for the following components:    Glucose 151 (*)     All other components within normal limits   PROTIME-INR - Normal   APTT - Normal   AMMONIA - Normal   RAINBOW DRAW    Narrative:     The following orders were created for panel order [place] Draw.  Procedure                               Abnormality         Status                     ---------                               -----------         ------                     Green Top (Gel)[[id number]]                                  " Final result               Lavender Top[[id number]]                                     Final result               Gold Top - SST[[id number]]                                   Final result               Light Blue Top[[id number]]                                   Final result                 Please view results for these tests on the individual orders.   COMPREHENSIVE METABOLIC PANEL    Narrative:     GFR Normal >60  Chronic Kidney Disease <60  Kidney Failure <15     ETHANOL    Narrative:     Plasma Ethanol Clinical Symptoms:    ETOH (%)               Clinical Symptom  .01-.05              No apparent influence  .03-.12              Euphoria, Diminished judgment and attention   .09-.25              Impaired comprehension, Muscle incoordination  .18-.30              Confusion, Staggered gait, Slurred speech  .25-.40              Markedly decreased response to stimuli, unable to stand or                        walk, vomitting, sleep or stupor  .35-.50              Comatose, Anesthesia, Subnormal body temperature       POCT GLUCOSE FINGERSTICK   TYPE AND SCREEN   BB ARMBAND CHECK   CBC AND DIFFERENTIAL    Narrative:     The following orders were created for panel order CBC & Differential.  Procedure                               Abnormality         Status                     ---------                               -----------         ------                     CBC Auto Differential[[id number]]        Abnormal            Final result                 Please view results for these tests on the individual orders.   GREEN TOP   LAVENDER TOP   GOLD TOP - SST   LIGHT BLUE TOP       LOC: Person, Place, Time, and Situation    Telemetry:  Telemetry    Cardiac Monitoring Ordered: yes    EKG:   ECG 12 Lead Stroke Evaluation   Preliminary Result   HEART RATE= 74  bpm   RR Interval= 820  ms   WY Interval= 231  ms   P Horizontal Axis= -44  deg   P Front Axis= 64  deg   QRSD Interval= 89  ms   QT Interval= 408  ms   QRS Axis= 8  deg   T  Wave Axis= 29  deg   - ABNORMAL ECG -   Sinus rhythm   Multiple premature complexes, vent & supraven   Prolonged CT interval   Inferior infarct, old   When compared with ECG of 10-May-2018 18:58:58,   New conduction abnormality   Electronically Signed By:    Date and Time of Study: 2023-05-26 21:34:25          Medications Given in the ED:   Medications   sodium chloride 0.9 % flush 10 mL (has no administration in time range)   sodium chloride 0.9 % bolus 500 mL (500 mL Intravenous New Bag 5/26/23 2247)   Enoxaparin Sodium (LOVENOX) syringe 40 mg (has no administration in time range)   iopamidol (ISOVUE-370) 76 % injection 150 mL (150 mL Intravenous Given 5/26/23 2124)   aspirin tablet 325 mg (325 mg Oral Given 5/26/23 2202)   thiamine (B-1) injection 100 mg (100 mg Intravenous Given 5/26/23 2247)       Imaging results:  CT Angiogram Neck    Result Date: 5/26/2023  1.No hemodynamically significant stenosis, large vessel cut or aneurysms in intracranial circulation 2.No hemodynamically significant stenosis, dissection or aneurysms in extracranial circulation. 3.Ascending aortic root aneurysm measuring 4.2 cm in AP dimension. Electronically Signed: Rinku Ali  5/26/2023 9:45 PM EDT  Workstation ID: [id number]    XR Chest 1 View    Result Date: 5/26/2023  1.Bibasilar opacities may represent atelectasis or infiltrates. 2.Cardiomegaly. Electronically Signed: Rinku Ali  5/26/2023 10:30 PM EDT  Workstation ID: [id number]    CT Head Without Contrast Stroke Protocol    Result Date: 5/26/2023  1.No acute intracranial hemorrhage. Calvarium is intact. Electronically Signed: Rinku Ali  5/26/2023 9:22 PM EDT  Workstation ID: [id number]    CT Angiogram Head w AI Analysis of LVO    Result Date: 5/26/2023  1.No hemodynamically significant stenosis, large vessel cut or aneurysms in intracranial circulation 2.No hemodynamically significant stenosis, dissection or aneurysms in extracranial circulation. 3.Ascending aortic root aneurysm  measuring 4.2 cm in AP dimension. Electronically Signed: Rinku Sexton  5/26/2023 9:45 PM EDT  Workstation ID: [id number]    CT CEREBRAL PERFUSION WITH & WITHOUT CONTRAST    Result Date: 5/26/2023  Impression: 1.No core infarct is identified. 2.Findings raising suspicion for tissue at risk in left parietal lobe. MRI can be obtained for further evaluation. Case discussed with Dr. TONY BURGER @ 5/26/2023 10:01 PM EDT. Electronically Signed: Rinku Carlie  5/26/2023 10:01 PM EDT  Workstation ID: [id number]     Social issues:   Social History     Socioeconomic History    Marital status:    Tobacco Use    Smoking status: Never    Smokeless tobacco: Never   Substance and Sexual Activity    Alcohol use: Yes     Comment: daily    Drug use: No    Sexual activity: Defer       NIH Stroke Scale:  Interval: baseline (05/26/23 2117)  1a. Level of Consciousness: 0-->Alert, keenly responsive (05/26/23 2200)  1b. LOC Questions: 0-->Answers both questions correctly (05/26/23 2200)  1c. LOC Commands: 0-->Performs both tasks correctly (05/26/23 2200)  2. Best Gaze: 0-->Normal (05/26/23 2200)  3. Visual: 0-->No visual loss (05/26/23 2200)  4. Facial Palsy: 0-->Normal symmetrical movements (05/26/23 2200)  5a. Motor Arm, Left: 0-->No drift, limb holds 90 (or 45) degrees for full 10 secs (05/26/23 2200)  5b. Motor Arm, Right: 0-->No drift, limb holds 90 (or 45) degrees for full 10 secs (05/26/23 2200)  6a. Motor Leg, Left: 0-->No drift, leg holds 30 degree position for full 5 secs (05/26/23 2200)  6b. Motor Leg, Right: 0-->No drift, leg holds 30 degree position for full 5 secs (05/26/23 2200)  7. Limb Ataxia: 0-->Absent (05/26/23 2200)  8. Sensory: 0-->Normal, no sensory loss (05/26/23 2200)  9. Best Language: 0-->No aphasia, normal (05/26/23 2200)  10. Dysarthria: 0-->Normal (05/26/23 2200)  11. Extinction and Inattention (formerly Neglect): 0-->No abnormality (05/26/23 2200)    Total (NIH Stroke Scale): 0 (05/26/23 2200)      Additional notable assessment information:     Nursing report ED to floor:      Michaelle Arguello RN   05/26/23 23:44 EDT

## 2023-05-27 NOTE — H&P
[facility] Medicine Services  History & Physical    Patient Name: Juarez Christina  : 1955  MRN: [id number]  Primary Care Physician:  Mmae Starks MD  Date of admission: 2023  Date and Time of Service: 23 at 2345    Subjective      Chief Complaint: Aphasia    History of Present Illness: Juarez Christina is a 67 y.o. male with past medical history of hypertension, hyperlipidemia, non-Hodgkin's lymphoma status post bone marrow transplant, fatty liver and macular degeneration.  Who presented to [facility] on 2023 complaining of aphasia.    Patient was downtown and event called [address] when he had acute onset of aphasia.  Wife reports that lasted about 30 seconds.  Patient reports when it occurred he was sitting having a beer.  On the way to the hospital wife reports patient had at least 2 more episodes where he was nonsensical in speech.  When he got to the ED he had a couple more episodes where he was experiencing more expressive aphasia.  Patient reports he was not able to speak what he was thinking.  He reports during the episode he had mild dizziness but no other symptoms.  Denies any unilateral weakness, headache, vision changes, chest pain or shortness of breath.    In the ED work-up essentially negative.  Chest x-ray showed bibasilar opacities that may represent atelectasis or infiltrate.  And cardiomegaly.  CT head and neck showed no significant stenosis.  Ascending aortic root aneurysm measuring 4.2 cm in AP dimension.  CT perfusion without contrast showed no core infarct but findings suspicion for tissue at risk in left parietal lobe recommend MRI.  Patient will be admitted for follow-up MRI and echo and neurology to see.        Review of Systems   Constitutional: Negative.   HENT: Negative.    Cardiovascular: Negative.    Respiratory: Negative.    Skin: Negative.    Musculoskeletal: Negative.    Gastrointestinal: Negative.    Genitourinary: Negative.     Neurological:        Expressive aphasia     Psychiatric/Behavioral: Negative.         Personal History     Past Medical History:   Diagnosis Date   • Acute [id number]-xfvb disease    • Cancer    • H/O acute respiratory failure    • Hepatitis    • Hypertension    • Immune deficiency disorder    • Kidney stone    • Macular degeneration    • Multi-organ failure with heart failure     history   • Non Hodgkin's lymphoma    • Radiation necrosis of skin and subcutaneous        Past Surgical History:   Procedure Laterality Date   • BONE MARROW TRANSPLANT     • CHOLECYSTECTOMY     • KNEE ASPIRATION     • SMALL INTESTINE SURGERY     • TOTAL SHOULDER REPLACEMENT         Family History: family history is not on file. Otherwise pertinent FHx was reviewed and not pertinent to current issue.    Social History:  reports that he has never smoked. He has never used smokeless tobacco. He reports current alcohol use. He reports that he does not use drugs.    Home Medications:  Prior to Admission Medications     Prescriptions Last Dose Informant Patient Reported? Taking?    aspirin 81 MG EC tablet   Yes No    Take 81 mg by mouth Daily.    atorvastatin (LIPITOR) 20 MG tablet   Yes No    Take 10 mg by mouth Daily.    cephalexin (KEFLEX) 500 MG capsule   No No    Take 1 capsule by mouth 4 (Four) Times a Day.    lisinopril (PRINIVIL,ZESTRIL) 10 MG tablet   Yes No    Take 10 mg by mouth Daily.    metoprolol succinate XL (TOPROL-XL) 100 MG 24 hr tablet   Yes No    Take 100 mg by mouth Daily.    sildenafil (VIAGRA) 100 MG tablet   Yes No    Take 100 mg by mouth.            Allergies:  No Known Allergies    Objective      Vitals:   Temp:  [98.6 °F (37 °C)] 98.6 °F (37 °C)  Heart Rate:  [64-82] 66  Resp:  [18] 18  BP: (151-156)/(81-94) 154/94    Physical Exam  Constitutional:       Appearance: Normal appearance.   Eyes:      Pupils: Pupils are equal, round, and reactive to light.   Cardiovascular:      Rate and Rhythm: Normal rate and  regular rhythm.      Pulses: Normal pulses.   Pulmonary:      Effort: Pulmonary effort is normal.      Breath sounds: Normal breath sounds.   Abdominal:      General: Abdomen is flat.      Palpations: Abdomen is soft.   Musculoskeletal:         General: Normal range of motion.   Skin:     Comments: Skin reddened from sun exposure   BLE with some discoloration. Appearance of possible underlying PVD   Neurological:      Mental Status: He is alert and oriented to person, place, and time.   Psychiatric:         Mood and Affect: Mood normal.         Behavior: Behavior normal.          Result Review    Result Review:  I have personally reviewed the results from the time of this admission to 5/26/2023 23:47 EDT and agree with these findings:  [x]  Laboratory  []  Microbiology  [x]  Radiology  [x]  EKG/Telemetry   []  Cardiology/Vascular   []  Pathology  []  Old records  []  Other:  Most notable findings include: see above HPI    Assessment & Plan        Active Hospital Problems:  Active Hospital Problems    Diagnosis    • **Transient ischemic attack      Plan:   Acute onset of expressive aphasia- resolved  - rule out TIA vs CVA vs other  - MRI pending  - echo pending  - monitor    HTN  - continue lisinopril and metoprolol    HLD  - continue statin    Hx non-Hodgkin's Lymphoma s/p bone marrow transplant with complication of multisystem organ failure   - stable      DVT prophylaxis:  Medical DVT prophylaxis orders are present.    CODE STATUS:       Admission Status:  I believe this patient meets obervation status.    I discussed the patient's findings and my recommendations with patient and family.    This patient has been examined wearing appropriate Personal Protective Equipment     Signature: Electronically signed by ROLANDO Meadows, 05/26/23, 23:47 EDT.  [facility] Hospitalist Team

## 2023-05-27 NOTE — CONSULTS
Referring Provider: Ej Greene MD    Reason for Consultation: Stroke and cardioembolic source      Patient Care Team:  Mame Starks MD as PCP - General (Internal Medicine)      SUBJECTIVE     Chief Complaint: Aphasia    History of present illness:  Juarez Christina is a 67 y.o. male with hypertension, hyperlipidemia, non-Hodgkin's lymphoma status post bone marrow transplant, fatty liver, macular degeneration who presented to the hospital with complaints of aphasia.  His wife reported that this episode lasted about 30 seconds and occurred while he was sitting and having a beer.  On the way to the hospital patient had at least 2 more episodes where he was found to have nonsensical speech.  In the emergency room he was diagnosed with a small subacute infarct within the left insula and tiny subacute infarcts within the left caudate head and left high frontal lobe.  Neurology is on board and cardiology was consulted to look for cardioembolic source.    Review of systems:    Constitutional: No weakness, fatigue, fever, rigors, chills   Eyes: No vision changes, eye pain   ENT/oropharynx: No difficulty swallowing, sore throat, epistaxis, changes in hearing   Cardiovascular: No chest pain, chest tightness, palpitations, paroxysmal nocturnal dyspnea, orthopnea, diaphoresis, dizziness / syncopal episode   Respiratory: No shortness of breath, dyspnea on exertion, cough, wheezing, hemoptysis   Gastrointestinal: No abdominal pain, nausea, vomiting, diarrhea, bloody stools   Genitourinary: No hematuria, dysuria   Neurological:  Speech changes, episodic aphasia.   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   • History of kidney stones 5/27/2023   • History of non-Hodgkin's lymphoma 5/27/2023   • Hypertension    • Immune deficiency disorder    • Kidney stone    • Macular degeneration    • Mild pulmonary hypertension 5/27/2023   • Mixed hyperlipidemia 5/27/2023   • Moderate aortic valve stenosis 5/27/2023   • Multi-organ failure with heart failure     history   • Non Hodgkin's lymphoma    • Obesity (BMI 30-39.9) 5/27/2023   • Radiation necrosis of skin and subcutaneous        Past Surgical History:   Procedure Laterality Date   • BONE MARROW TRANSPLANT     • CHOLECYSTECTOMY     • KNEE ASPIRATION     • SMALL INTESTINE SURGERY     • TOTAL SHOULDER REPLACEMENT         History reviewed. No pertinent family history.    Social History     Tobacco Use   • Smoking status: Never   • Smokeless tobacco: Never   Vaping Use   • Vaping Use: Never used   Substance Use Topics   • Alcohol use: Yes     Alcohol/week: 10.0 standard drinks     Types: 10 Cans of beer per week     Comment: daily   • Drug use: No        Medications Prior to Admission   Medication Sig Dispense Refill Last Dose   • aspirin 81 MG chewable tablet Chew 1 tablet Daily.      • atorvastatin (LIPITOR) 20 MG tablet Take 10 mg by mouth Daily.      • lisinopril (PRINIVIL,ZESTRIL) 10 MG tablet Take 10 mg by mouth Daily.      • metoprolol succinate XL (TOPROL-XL) 100 MG 24 hr tablet Take 100 mg by mouth Daily.      • sildenafil (VIAGRA) 100 MG tablet Take 100 mg by mouth.           Allergies:     Acetaminophen    Scheduled Meds:aspirin, 81 mg, Oral, Daily  [START ON 5/28/2023] atorvastatin, 40 mg, Oral, Daily  clopidogrel, 75 mg, Oral, Daily  lisinopril, 10 mg, Oral, Daily  [START ON 5/28/2023] metoprolol succinate XL, 50 mg, Oral, Daily  senna-docusate sodium, 2 tablet, Oral, BID  sodium chloride, 10 mL, Intravenous, Q12H      Continuous Infusions:   PRN Meds:•   "acetaminophen  •  senna-docusate sodium **AND** polyethylene glycol **AND** bisacodyl **AND** bisacodyl  •  nitroglycerin  •  sodium chloride  •  sodium chloride  •  sodium chloride      OBJECTIVE    Vital Signs  Vitals:    05/27/23 1630 05/27/23 1700 05/27/23 1953 05/27/23 2039   BP:    146/92   BP Location:    Right arm   Patient Position:    Lying   Pulse: 50 (!) 48 54 54   Resp:       Temp:   97.5 °F (36.4 °C)    TempSrc:   Oral    SpO2:    98%   Weight:       Height:           Flowsheet Rows    Flowsheet Row First Filed Value   Admission Height 177.8 cm (70\") Documented at 05/26/2023 2105   Admission Weight 106 kg (233 lb 11 oz) Documented at 05/26/2023 2058            Intake/Output Summary (Last 24 hours) at 5/27/2023 2129  Last data filed at 5/27/2023 2000  Gross per 24 hour   Intake 480 ml   Output 975 ml   Net -495 ml        Telemetry: Sinus rhythm with sinus bradycardia and first-degree AV block    Physical Exam:  The patient is alert, oriented and in no distress.  Vital signs as noted above.  Head and neck revealed no carotid bruits or jugular venous distention.  No thyromegaly or lymph adenopathy is present  Lungs clear.  No wheezing.  Breath sounds are normal bilaterally.  Heart normal first and second heart sounds. No murmur.  No precordial rub is present.  No gallop is present.  Abdomen soft and nontender.  No organomegaly is present.  Extremities with good peripheral pulses without any pedal edema.  Skin warm and dry.  Musculoskeletal system is grossly normal.  CNS grossly normal.       Results Review:  I have personally reviewed the results from the time of this admission to 5/27/2023 21:29 EDT and agree with these findings:  []  Laboratory  []  Microbiology  []  Radiology  []  EKG/Telemetry   []  Cardiology/Vascular   []  Pathology  []  Old records  []  Other:    Most notable findings include:     Lab Results (last 24 hours)     Procedure Component Value Units Date/Time    Folate [[id number]]  " (Normal) Collected: 05/27/23 1415    Specimen: Blood Updated: 05/27/23 1750     Folate 8.41 ng/mL     Narrative:      Results may be falsely increased if patient taking Biotin.      Vitamin B12 [[id number]]  (Normal) Collected: 05/27/23 1415    Specimen: Blood Updated: 05/27/23 1750     Vitamin B-12 281 pg/mL     Narrative:      Results may be falsely increased if patient taking Biotin.      Extra Tubes [[id number]] Collected: 05/27/23 1415    Specimen: Blood, Venous Line Updated: 05/27/23 1515    Narrative:      The following orders were created for panel order Extra Tubes.  Procedure                               Abnormality         Status                     ---------                               -----------         ------                     Light Blue Top[[id number]]                                   Final result                 Please view results for these tests on the individual orders.    Light Blue Top [[id number]] Collected: 05/27/23 1415    Specimen: Blood Updated: 05/27/23 1515     Extra Tube Hold for add-ons.     Comment: Auto resulted       TSH [[id number]]  (Normal) Collected: 05/27/23 1415    Specimen: Blood Updated: 05/27/23 1456     TSH 3.770 uIU/mL     T4, Free [[id number]]  (Abnormal) Collected: 05/27/23 1415    Specimen: Blood Updated: 05/27/23 1456     Free T4 0.92 ng/dL     Narrative:      Results may be falsely increased if patient taking Biotin.      Lipid Panel [[id number]]  (Abnormal) Collected: 05/27/23 1415    Specimen: Blood Updated: 05/27/23 1450     Total Cholesterol 147 mg/dL      Triglycerides 317 mg/dL      HDL Cholesterol 40 mg/dL      LDL Cholesterol  58 mg/dL      VLDL Cholesterol 49 mg/dL      LDL/HDL Ratio 1.09    Narrative:      Cholesterol Reference Ranges  (U.S. Department of Health and Human Services ATP III Classifications)    Desirable          <200 mg/dL  Borderline High    200-239 mg/dL  High Risk          >240 mg/dL      Triglyceride Reference Ranges  (U.S. Department of  Health and Human Services ATP III Classifications)    Normal           <150 mg/dL  Borderline High  150-199 mg/dL  High             200-499 mg/dL  Very High        >500 mg/dL    HDL Reference Ranges  (U.S. Department of Health and Human Services ATP III Classifications)    Low     <40 mg/dl (major risk factor for CHD)  High    >60 mg/dl ('negative' risk factor for CHD)        LDL Reference Ranges  (U.S. Department of Health and Human Services ATP III Classifications)    Optimal          <100 mg/dL  Near Optimal     100-129 mg/dL  Borderline High  130-159 mg/dL  High             160-189 mg/dL  Very High        >189 mg/dL    Hemoglobin A1c [[id number]]  (Abnormal) Collected: 05/27/23 1415    Specimen: Blood Updated: 05/27/23 1443     Hemoglobin A1C 6.20 %     Extra Tubes [[id number]] Collected: 05/27/23 0658    Specimen: Blood, Venous Line Updated: 05/27/23 0801    Narrative:      The following orders were created for panel order Extra Tubes.  Procedure                               Abnormality         Status                     ---------                               -----------         ------                     Lavender Top[[id number]]                                     Final result                 Please view results for these tests on the individual orders.    Lavender Top [[id number]] Collected: 05/27/23 0658    Specimen: Blood Updated: 05/27/23 0801     Extra Tube hold for add-on     Comment: Auto resulted       High Sensitivity Troponin T 2Hr [[id number]]  (Abnormal) Collected: 05/27/23 0658    Specimen: Blood Updated: 05/27/23 0732     HS Troponin T 25 ng/L      Troponin T Delta -1 ng/L     Narrative:      High Sensitive Troponin T Reference Range:  <10.0 ng/L- Negative Female for AMI  <15.0 ng/L- Negative Male for AMI  >=10 - Abnormal Female indicating possible myocardial injury.  >=15 - Abnormal Male indicating possible myocardial injury.   Clinicians would have to utilize clinical acumen, EKG, Troponin, and  serial changes to determine if it is an Acute Myocardial Infarction or myocardial injury due to an underlying chronic condition.         High Sensitivity Troponin T [[id number]]  (Abnormal) Collected: 05/27/23 0456    Specimen: Blood Updated: 05/27/23 0524     HS Troponin T 26 ng/L     Narrative:      High Sensitive Troponin T Reference Range:  <10.0 ng/L- Negative Female for AMI  <15.0 ng/L- Negative Male for AMI  >=10 - Abnormal Female indicating possible myocardial injury.  >=15 - Abnormal Male indicating possible myocardial injury.   Clinicians would have to utilize clinical acumen, EKG, Troponin, and serial changes to determine if it is an Acute Myocardial Infarction or myocardial injury due to an underlying chronic condition.         [place] Draw [[id number]] Collected: 05/26/23 2115    Specimen: Blood Updated: 05/26/23 2231    Narrative:      The following orders were created for panel order [place] Draw.  Procedure                               Abnormality         Status                     ---------                               -----------         ------                     Green Top (Gel)[[id number]]                                  Final result               Lavender Top[[id number]]                                     Final result               Gold Top - SST[[id number]]                                   Final result               Light Blue Top[[id number]]                                   Final result                 Please view results for these tests on the individual orders.    Green Top (Gel) [[id number]] Collected: 05/26/23 2115    Specimen: Blood Updated: 05/26/23 2231     Extra Tube Hold for add-ons.     Comment: Auto resulted.       Lavender Top [[id number]] Collected: 05/26/23 2115    Specimen: Blood Updated: 05/26/23 2231     Extra Tube hold for add-on     Comment: Auto resulted       Light Blue Top [[id number]] Collected: 05/26/23 2115    Specimen: Blood Updated: 05/26/23 2231     Extra Tube Hold for  add-ons.     Comment: Auto resulted       Gold Top - SST [[id number]] Collected: 05/26/23 2115    Specimen: Blood Updated: 05/26/23 2212    Comprehensive Metabolic Panel [[id number]] Collected: 05/26/23 2115    Specimen: Blood Updated: 05/26/23 2201     Glucose 86 mg/dL      BUN 22 mg/dL      Creatinine 1.20 mg/dL      Sodium 140 mmol/L      Potassium 4.1 mmol/L      Chloride 104 mmol/L      CO2 24.0 mmol/L      Calcium 8.8 mg/dL      Total Protein 6.6 g/dL      Albumin 4.1 g/dL      ALT (SGPT) 21 U/L      AST (SGOT) 36 U/L      Alkaline Phosphatase 87 U/L      Total Bilirubin 0.6 mg/dL      Globulin 2.5 gm/dL      A/G Ratio 1.6 g/dL      BUN/Creatinine Ratio 18.3     Anion Gap 12.0 mmol/L      eGFR 65.9 mL/min/1.73     Narrative:      GFR Normal >60  Chronic Kidney Disease <60  Kidney Failure <15      Single High Sensitivity Troponin T [[id number]]  (Abnormal) Collected: 05/26/23 2115    Specimen: Blood Updated: 05/26/23 2201     HS Troponin T 25 ng/L     Narrative:      High Sensitive Troponin T Reference Range:  <10.0 ng/L- Negative Female for AMI  <15.0 ng/L- Negative Male for AMI  >=10 - Abnormal Female indicating possible myocardial injury.  >=15 - Abnormal Male indicating possible myocardial injury.   Clinicians would have to utilize clinical acumen, EKG, Troponin, and serial changes to determine if it is an Acute Myocardial Infarction or myocardial injury due to an underlying chronic condition.         Ethanol [[id number]] Collected: 05/26/23 2115    Specimen: Blood Updated: 05/26/23 2201     Ethanol % 0.046 %     Narrative:      Plasma Ethanol Clinical Symptoms:    ETOH (%)               Clinical Symptom  .01-.05              No apparent influence  .03-.12              Euphoria, Diminished judgment and attention   .09-.25              Impaired comprehension, Muscle incoordination  .18-.30              Confusion, Staggered gait, Slurred speech  .25-.40              Markedly decreased response to stimuli, unable  to stand or                        walk, vomitting, sleep or stupor  .35-.50              Comatose, Anesthesia, Subnormal body temperature        Ammonia [[id number]]  (Normal) Collected: 05/26/23 2115    Specimen: Blood Updated: 05/26/23 2145     Ammonia 18 umol/L     Protime-INR [[id number]]  (Normal) Collected: 05/26/23 2115    Specimen: Blood Updated: 05/26/23 2137     Protime 10.7 Seconds      INR 1.00    aPTT [[id number]]  (Normal) Collected: 05/26/23 2115    Specimen: Blood Updated: 05/26/23 2137     PTT 26.1 seconds           Imaging Results (Last 24 Hours)     Procedure Component Value Units Date/Time    MRI Brain Without Contrast [[id number]] Collected: 05/27/23 1648     Updated: 05/27/23 1656    Narrative:      MRI BRAIN WO CONTRAST    Date of Exam: 5/27/2023 11:14 AM EDT    Indication: Stroke, follow up.     Comparison: CT head from May 26, 2023    Technique:  Routine multiplanar/multisequence sequence images of the brain were obtained without contrast administration.      Findings:  There is a small subacute infarct within the left insula, and tiny subacute infarcts within the left caudate head and left high frontal lobe. There is some petechial staining within the left caudate infarct, but no fadi hemorrhagic transformation. The   ventricles are stable in caliber, with no midline shift. The basal cisterns appear patent. Subtle foci of periventricular and subcortical white matter FLAIR hyperintensities are nonspecific, but likely the sequela of minimal chronic small vessel ischemic   disease.    The midline structures appear intact. The globes and orbits appear intact, with note of a right lens replacement. The intracranial vascular flow-voids appear patent.      Impression:      Impression:  1.Small subacute infarct within left insula, and tiny subacute infarcts within left caudate head and left high frontal lobe. Petechial staining within left caudate infarct, but no fadi hemorrhagic  transformation.  2.Findings suggestive of minimal chronic small vessel ischemic disease.        Electronically Signed: Juventino Bell    5/27/2023 4:53 PM EDT    Workstation ID: [id number]    XR Chest 1 View [[id number]] Collected: 05/26/23 2229     Updated: 05/26/23 2232    Narrative:      XR CHEST 1 VW    Date of Exam: 5/26/2023 9:44 PM EDT    Indication: Acute Stroke Protocol (onset < 12 hrs)    Comparison: 9/30/2018    Findings:    LUNGS: Bibasilar opacities.  PNEUMOTHORAX: None.    HEART SIZE: Cardiomegaly.        Impression:      1.Bibasilar opacities may represent atelectasis or infiltrates.  2.Cardiomegaly.      Electronically Signed: Rinku Sexton    5/26/2023 10:30 PM EDT    Workstation ID: [id number]    CT CEREBRAL PERFUSION WITH & WITHOUT CONTRAST [[id number]] Collected: 05/26/23 2147     Updated: 05/26/23 2203    Narrative:      CT CEREBRAL PERFUSION W WO CONTRAST    Date of Exam: 5/26/2023 9:13 PM EDT    Indication: Neuro deficit, acute, stroke suspected.     Comparison: CT head and CTA head and neck 5/26/2023    Technique: Axial CT images of the brain were obtained prior to and after the administration of iodinated contrast. CT Perfusion protocol was utilized. Automated post processing was performed by RAPID software and submitted to PACS for interpretation.   Automated exposure control and iterative reconstruction was utilized.      Findings:  *CBF less than 30%: 0 mL. No definite core infarct is identified.  *Tmax greater than 6 seconds: 32 mL. This may represent tissue at risk in left parietal lobe.  *Mismatch volume with 32 mL.  *Mismatch ratio is infinite.      Impression:      Impression:  1.No core infarct is identified.  2.Findings raising suspicion for tissue at risk in left parietal lobe. MRI can be obtained for further evaluation.      Case discussed with Dr. TONY BURGER @ 5/26/2023 10:01 PM EDT.      Electronically Signed: Rinku Sexton    5/26/2023 10:01 PM EDT    Workstation ID: [id number]     CT Angiogram Head w AI Analysis of LVO [[id number]] Collected: 05/26/23 2128     Updated: 05/26/23 2147    Narrative:      CT ANGIOGRAM HEAD W AI ANALYSIS OF LVO, CT ANGIOGRAM NECK    Date of Exam: 5/26/2023 9:13 PM EDT    Indication: Stroke, follow up  Neuro deficit, acute stroke suspected  Acute Stroke.    Comparison: CT head without contrast 5/26/2023    Technique: CTA of the head and neck was performed after the uneventful intravenous administration of iodinated contrast. Reconstructed coronal and sagittal images were also obtained. In addition, a 3-D volume rendered image was created for   interpretation. Automated exposure control and iterative reconstruction methods were used.      Findings:    CTA NECK:  *Aortic arch: No aneurysm. No significant stenosis or occlusion of the major arch vessels.  *Left carotid system: No aneurysm, significant stenosis or occlusion.  *Right carotid system: No aneurysm, significant stenosis or occlusion.  *Vertebrobasilar system: The vertebral arteries arise from their respective subclavian arteries. No aneurysm, significant stenosis or occlusion.    CTA HEAD:  *Anterior circulation: No aneurysm, significant stenosis or occlusion.  *Posterior circulation: No aneurysm, significant stenosis or occlusion.  *Anatomic variants: None of significance.  *Venous sinuses: Patent.      Impression:      1.No hemodynamically significant stenosis, large vessel cut or aneurysms in intracranial circulation  2.No hemodynamically significant stenosis, dissection or aneurysms in extracranial circulation.  3.Ascending aortic root aneurysm measuring 4.2 cm in AP dimension.        Electronically Signed: Rinku Sexton    5/26/2023 9:45 PM EDT    Workstation ID: [id number]    CT Angiogram Neck [[id number]] Collected: 05/26/23 2128     Updated: 05/26/23 2147    Narrative:      CT ANGIOGRAM HEAD W AI ANALYSIS OF LVO, CT ANGIOGRAM NECK    Date of Exam: 5/26/2023 9:13 PM EDT    Indication: Stroke, follow  up  Neuro deficit, acute stroke suspected  Acute Stroke.    Comparison: CT head without contrast 5/26/2023    Technique: CTA of the head and neck was performed after the uneventful intravenous administration of iodinated contrast. Reconstructed coronal and sagittal images were also obtained. In addition, a 3-D volume rendered image was created for   interpretation. Automated exposure control and iterative reconstruction methods were used.      Findings:    CTA NECK:  *Aortic arch: No aneurysm. No significant stenosis or occlusion of the major arch vessels.  *Left carotid system: No aneurysm, significant stenosis or occlusion.  *Right carotid system: No aneurysm, significant stenosis or occlusion.  *Vertebrobasilar system: The vertebral arteries arise from their respective subclavian arteries. No aneurysm, significant stenosis or occlusion.    CTA HEAD:  *Anterior circulation: No aneurysm, significant stenosis or occlusion.  *Posterior circulation: No aneurysm, significant stenosis or occlusion.  *Anatomic variants: None of significance.  *Venous sinuses: Patent.      Impression:      1.No hemodynamically significant stenosis, large vessel cut or aneurysms in intracranial circulation  2.No hemodynamically significant stenosis, dissection or aneurysms in extracranial circulation.  3.Ascending aortic root aneurysm measuring 4.2 cm in AP dimension.        Electronically Signed: Rinku Sexton    5/26/2023 9:45 PM EDT    Workstation ID: [id number]          LAB RESULTS (LAST 7 DAYS)    CBC  Results from last 7 days   Lab Units 05/26/23 2115   WBC 10*3/mm3 6.60   RBC 10*6/mm3 4.48   HEMOGLOBIN g/dL 15.1   HEMATOCRIT % 46.4   MCV fL 103.5*   PLATELETS 10*3/mm3 148       BMP  Results from last 7 days   Lab Units 05/26/23 2115   SODIUM mmol/L 140   POTASSIUM mmol/L 4.1   CHLORIDE mmol/L 104   CO2 mmol/L 24.0   BUN mg/dL 22   CREATININE mg/dL 1.20   GLUCOSE mg/dL 86       CMP   Results from last 7 days   Lab Units 05/26/23 2115    SODIUM mmol/L 140   POTASSIUM mmol/L 4.1   CHLORIDE mmol/L 104   CO2 mmol/L 24.0   BUN mg/dL 22   CREATININE mg/dL 1.20   GLUCOSE mg/dL 86   ALBUMIN g/dL 4.1   BILIRUBIN mg/dL 0.6   ALK PHOS U/L 87   AST (SGOT) U/L 36   ALT (SGPT) U/L 21   AMMONIA umol/L 18       BNP        TROPONIN  Results from last 7 days   Lab Units 05/27/23  0658   HSTROP T ng/L 25*       CoAg  Results from last 7 days   Lab Units 05/26/23  2115   INR  1.00   APTT seconds 26.1       Creatinine Clearance  Estimated Creatinine Clearance: 71.5 mL/min (by C-G formula based on SCr of 1.2 mg/dL).    ABG          Radiology  CT Angiogram Neck    Result Date: 5/26/2023  1.No hemodynamically significant stenosis, large vessel cut or aneurysms in intracranial circulation 2.No hemodynamically significant stenosis, dissection or aneurysms in extracranial circulation. 3.Ascending aortic root aneurysm measuring 4.2 cm in AP dimension. Electronically Signed: Rinku Sexton  5/26/2023 9:45 PM EDT  Workstation ID: [id number]    MRI Brain Without Contrast    Result Date: 5/27/2023  Impression: 1.Small subacute infarct within left insula, and tiny subacute infarcts within left caudate head and left high frontal lobe. Petechial staining within left caudate infarct, but no fadi hemorrhagic transformation. 2.Findings suggestive of minimal chronic small vessel ischemic disease. Electronically Signed: Juventino Bell  5/27/2023 4:53 PM EDT  Workstation ID: [id number]    XR Chest 1 View    Result Date: 5/26/2023  1.Bibasilar opacities may represent atelectasis or infiltrates. 2.Cardiomegaly. Electronically Signed: Rinku Sexton  5/26/2023 10:30 PM EDT  Workstation ID: [id number]    CT Head Without Contrast Stroke Protocol    Result Date: 5/26/2023  1.No acute intracranial hemorrhage. Calvarium is intact. Electronically Signed: Rinku Sexton  5/26/2023 9:22 PM EDT  Workstation ID: [id number]    CT Angiogram Head w AI Analysis of LVO    Result Date: 5/26/2023  1.No hemodynamically  significant stenosis, large vessel cut or aneurysms in intracranial circulation 2.No hemodynamically significant stenosis, dissection or aneurysms in extracranial circulation. 3.Ascending aortic root aneurysm measuring 4.2 cm in AP dimension. Electronically Signed: Rinku Sexton  5/26/2023 9:45 PM EDT  Workstation ID: [id number]    CT CEREBRAL PERFUSION WITH & WITHOUT CONTRAST    Result Date: 5/26/2023  Impression: 1.No core infarct is identified. 2.Findings raising suspicion for tissue at risk in left parietal lobe. MRI can be obtained for further evaluation. Case discussed with Dr. TONY ANGELA @ 5/26/2023 10:01 PM EDT. Electronically Signed: Rinku Sexton  5/26/2023 10:01 PM EDT  Workstation ID: [id number]        EKG  I personally viewed and interpreted the patient's EKG/Telemetry data:  ECG 12 Lead Stroke Evaluation   Final Result   HEART RATE= 74  bpm   RR Interval= 820  ms   WA Interval= 231  ms   P Horizontal Axis= -44  deg   P Front Axis= 64  deg   QRSD Interval= 89  ms   QT Interval= 408  ms   QRS Axis= 8  deg   T Wave Axis= 29  deg   - ABNORMAL ECG -   Sinus rhythm, prev SR 05/10/2018   Multiple premature complexes, vent & supraven, hx prev PAC   Prolonged WA interval, since 05/10/2018   Inferior infarct, old   When compared with ECG of 10-May-2018 18:58:58,   New conduction abnormality   Electronically Signed By: Tony Angela ([facility]) 27-May-2023 08:49:44   Date and Time of Study: 2023-05-26 21:34:25            Echocardiogram:    Results for orders placed during the hospital encounter of 05/26/23    Adult Transthoracic Echo Complete W/ Cont if Necessary Per Protocol    Interpretation Summary  •  Left ventricular systolic function is normal. Calculated left ventricular EF = 68% Left ventricular ejection fraction appears to be 66 - 70%.  •  Left ventricular diastolic dysfunction is noted.  •  The right ventricular cavity is dilated.  •  The left atrial cavity is dilated.  •  Saline test results are negative for  right to left atrial level shunt.  •  Moderate aortic valve stenosis is present.  •  Estimated right ventricular systolic pressure from tricuspid regurgitation is mildly elevated (35-45 mmHg).  •  Mild pulmonary hypertension is present.        Stress Test:        Cardiac Catheterization:  No results found for this or any previous visit.        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  CTA with no hemodynamically significant stenosis.  MRI shows small subacute infarct within left insula and tiny subacute infarct within the left caudate head and left high frontal lobe.  He has been started on dual antiplatelet therapy, high intensity statin  He will need a transesophageal echocardiogram.  30 days MCOT at the time of discharge.    Aortic stenosis and ascending aortic aneurysm  Echocardiogram shows EF of 68%, diastolic dysfunction.  Bubble study is negative however he has calcified and restricted opening of aortic valve consistent with aortic stenosis.  CTA also showed a sending aortic aneurysm 4.2 cm.  He will need a transesophageal echocardiogram to further evaluate the aortic valve and to rule out cardioembolic source for stroke.  Start Jardiance for HFpEF    Hypertension  Needs better blood pressure control.  He has been started on ACE inhibitor and beta-blocker.  Needs better blood pressure and heart rate control due to findings of aortic aneurysm.  If bradycardia becomes an issue he can be switched to calcium channel blocker.    Hyperlipidemia  LDL 58, HDL 40, total cholesterol 147.  Started on high intensity statin.  LDL goal is less than  70.    Diabetes  A1c 6.2  He should be started on metformin.  Consider starting Jardiance    Obesity  Diet, exercise, weight loss and lifestyle modification discussed with the patient.  Screening and treatment of sleep apnea.  Obesity is contributing to HFpEF.    Denton Kirkland MD  05/27/23  21:29 EDT

## 2023-05-28 ENCOUNTER — APPOINTMENT (OUTPATIENT)
Dept: RESPIRATORY THERAPY | Facility: HOSPITAL | Age: 68
End: 2023-05-28
Payer: MEDICARE

## 2023-05-28 VITALS
TEMPERATURE: 97.7 F | HEIGHT: 70 IN | OXYGEN SATURATION: 95 % | HEART RATE: 73 BPM | SYSTOLIC BLOOD PRESSURE: 155 MMHG | DIASTOLIC BLOOD PRESSURE: 75 MMHG | RESPIRATION RATE: 21 BRPM | WEIGHT: 226.63 LBS | BODY MASS INDEX: 32.45 KG/M2

## 2023-05-28 PROBLEM — R73.03 PREDIABETES: Chronic | Status: ACTIVE | Noted: 2023-05-28

## 2023-05-28 PROBLEM — I63.512 ACUTE ISCHEMIC LEFT MCA STROKE: Chronic | Status: ACTIVE | Noted: 2023-05-28

## 2023-05-28 PROBLEM — I67.9 CEREBROVASCULAR SMALL VESSEL DISEASE: Chronic | Status: ACTIVE | Noted: 2023-05-28

## 2023-05-28 PROCEDURE — 99232 SBSQ HOSP IP/OBS MODERATE 35: CPT | Performed by: INTERNAL MEDICINE

## 2023-05-28 PROCEDURE — 25010000002 CYANOCOBALAMIN PER 1000 MCG: Performed by: NURSE PRACTITIONER

## 2023-05-28 PROCEDURE — G0378 HOSPITAL OBSERVATION PER HR: HCPCS

## 2023-05-28 PROCEDURE — 96372 THER/PROPH/DIAG INJ SC/IM: CPT

## 2023-05-28 RX ORDER — CYANOCOBALAMIN 1000 UG/ML
1000 INJECTION, SOLUTION INTRAMUSCULAR; SUBCUTANEOUS
Qty: 1 ML | Refills: 5 | Status: SHIPPED | OUTPATIENT
Start: 2023-06-25

## 2023-05-28 RX ORDER — ATORVASTATIN CALCIUM 40 MG/1
40 TABLET, FILM COATED ORAL DAILY
Qty: 90 TABLET | Refills: 3 | Status: SHIPPED | OUTPATIENT
Start: 2023-05-29

## 2023-05-28 RX ORDER — CYANOCOBALAMIN 1000 UG/ML
1000 INJECTION, SOLUTION INTRAMUSCULAR; SUBCUTANEOUS
Status: DISCONTINUED | OUTPATIENT
Start: 2023-05-28 | End: 2023-05-28 | Stop reason: HOSPADM

## 2023-05-28 RX ORDER — METOPROLOL SUCCINATE 50 MG/1
50 TABLET, EXTENDED RELEASE ORAL DAILY
Qty: 90 TABLET | Refills: 3 | Status: SHIPPED | OUTPATIENT
Start: 2023-05-29

## 2023-05-28 RX ORDER — PANTOPRAZOLE SODIUM 40 MG/1
40 TABLET, DELAYED RELEASE ORAL DAILY
Qty: 90 TABLET | Refills: 3 | Status: SHIPPED | OUTPATIENT
Start: 2023-05-28 | End: 2023-06-12

## 2023-05-28 RX ORDER — CLOPIDOGREL BISULFATE 75 MG/1
75 TABLET ORAL DAILY
Qty: 90 TABLET | Refills: 3 | Status: SHIPPED | OUTPATIENT
Start: 2023-05-29

## 2023-05-28 RX ADMIN — ASPIRIN 81 MG CHEWABLE TABLET 81 MG: 81 TABLET CHEWABLE at 08:44

## 2023-05-28 RX ADMIN — METOPROLOL SUCCINATE 50 MG: 50 TABLET, EXTENDED RELEASE ORAL at 08:44

## 2023-05-28 RX ADMIN — CLOPIDOGREL BISULFATE 75 MG: 75 TABLET ORAL at 08:44

## 2023-05-28 RX ADMIN — ATORVASTATIN CALCIUM 40 MG: 40 TABLET, FILM COATED ORAL at 08:44

## 2023-05-28 RX ADMIN — CYANOCOBALAMIN 1000 MCG: 1000 INJECTION, SOLUTION INTRAMUSCULAR; SUBCUTANEOUS at 11:14

## 2023-05-28 RX ADMIN — Medication 10 ML: at 08:50

## 2023-05-28 RX ADMIN — LISINOPRIL 10 MG: 5 TABLET ORAL at 08:44

## 2023-05-28 NOTE — DISCHARGE SUMMARY
[facility] Medicine Services  Discharge Summary    Date of Service: 2023  Patient Name: Juarez Christina  : 1955  MRN: [id number]    Date of Admission: 2023  Discharge Diagnosis: see below  Date of Discharge:  2023  Primary Care Physician: Mame Straks MD    Presenting Problem:   Transient ischemic attack [G45.9]  Aortic root aneurysm [I71.21]    Active and Resolved Hospital Problems:  Active Hospital Problems    Diagnosis POA   • Prediabetes [R73.03] Yes   • Cerebrovascular small vessel disease [I67.9] Yes   • Acute ischemic left MCA stroke [I63.512] Yes   • History of non-Hodgkin's lymphoma [Z85.72] Not Applicable   • Essential hypertension [I10] Yes   • Mixed hyperlipidemia [E78.2] Yes   • Obesity (BMI 30-39.9) [E66.9] Yes   • History of bone marrow transplant [Z94.81] Not Applicable   • Fatty liver [K76.0] Yes   • Moderate aortic valve stenosis [I35.0] Yes   • Mild pulmonary hypertension [I27.20] Yes   • 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 male with past medical history of hypertension, hyperlipidemia, non-Hodgkin's lymphoma status post bone marrow transplant, fatty liver and macular degeneration.  Who presented to [facility] on [facility]23 complaining of aphasia.     Patient was downtown and event called [address] when he had acute onset of aphasia.  Wife reports that lasted about 30 seconds.  Patient reports when it occurred he was sitting having a beer.  On the way to the hospital wife reports patient had at least 2 more episodes where he was nonsensical in speech.  When he got to the ED he had a couple more episodes where he was experiencing more expressive aphasia.  Patient reports he was not able to speak what he was thinking.  He  reports during the episode he had mild dizziness but no other symptoms.  Denies any unilateral weakness, headache, vision changes, chest pain or shortness of breath.     In the ED work-up essentially negative.  Chest x-ray showed bibasilar opacities that may represent atelectasis or infiltrate.  And cardiomegaly.  CT head and neck showed no significant stenosis.  Ascending aortic root aneurysm measuring 4.2 cm in AP dimension.  CT perfusion without contrast showed no core infarct but findings suspicion for tissue at risk in left parietal lobe recommend MRI.  Patient will be admitted for follow-up MRI and echo and neurology to see.    Hospital course:  He was admitted in consultation with nephrology and cardiology for further evaluation and treatment.  All stroke symptoms have resolved. Mild troponin elevation on admission.  His echocardiogram was completed with findings as noted.  Alcohol level was minimal on admission.  Follow-up MRI was positive for a left MCA stroke.  He is on dual antiplatelet therapy now.  His statin dose was increased from 10 mg to 40 mg daily.  Thyroid studies look good with note of a slightly low free T4.  LDL was 58.  Total cholesterol 147, and his triglycerides were 317.  B12 levels were a little bit low at 281.  He was started on monthly injectable replacement. A1c was 6.2, and he reports having been told he was prediabetic in the past but had seen improvement before. Blood pressures have been reasonable.  He had intermittent bradycardia even while awake with heart rates averaging in the 60s but dipping into the 40s.  Toprol-XL dosing was decreased from 100 mg daily to 50 mg daily.  His wife will hold this at home if his heart rate is consistently in the low 50s or below.  No other arrhythmias were noted.  Cardiology is arranging a 30-day monitor, and he will have a transesophageal echocardiogram in the outpatient setting.  He will follow-up with his primary care provider to determine if  he wants to start monitoring his blood sugars and possibly start Jardiance or any other diabetic medication, or if he just wants to manage this with lifestyle changes.  He will discharge home today.    Day of Discharge     Vital Signs:  Temp:  [97.5 °F (36.4 °C)-98.3 °F (36.8 °C)] 98.2 °F (36.8 °C)  Heart Rate:  [44-72] 54  Resp:  [12-20] 17  BP: (131-161)/(72-98) 133/72    Physical Exam:  GEN: WDWN, no acute distress  HEENT: NCAT, PERRLA, moist mucous membranes  NECK: Supple, midline trachea  CARDIAC: RRR, no appreciable M/R/G, no peripheral edema  PULM: CTAB, nondistressed  ABD: soft, NTND, normoactive bowel sounds throughout  SKIN: Warm, dry  NEURO: Grossly intact, nonfocal exam  PSYCH: Pleasant    Pertinent  and/or Most Recent Results     LAB RESULTS:      Lab 05/26/23 2115   WBC 6.60   HEMOGLOBIN 15.1   HEMATOCRIT 46.4   PLATELETS 148   NEUTROS ABS 3.30   LYMPHS ABS 2.60   MONOS ABS 0.60   EOS ABS 0.10   .5*   PROTIME 10.7   APTT 26.1         Lab 05/27/23 1415 05/26/23 2115   SODIUM  --  140   POTASSIUM  --  4.1   CHLORIDE  --  104   CO2  --  24.0   ANION GAP  --  12.0   BUN  --  22   CREATININE  --  1.20   EGFR  --  65.9   GLUCOSE  --  86   CALCIUM  --  8.8   HEMOGLOBIN A1C 6.20*  --    TSH 3.770  --          Lab 05/26/23 2115   TOTAL PROTEIN 6.6   ALBUMIN 4.1   GLOBULIN 2.5   ALT (SGPT) 21   AST (SGOT) 36   BILIRUBIN 0.6   ALK PHOS 87         Lab 05/27/23  0658 05/27/23  0456 05/26/23 2115   HSTROP T 25* 26* 25*   PROTIME  --   --  10.7   INR  --   --  1.00         Lab 05/27/23 1415   CHOLESTEROL 147   LDL CHOL 58   HDL CHOL 40   TRIGLYCERIDES 317*         Lab 05/27/23 1415 05/26/23 2115   FOLATE 8.41  --    VITAMIN B 12 281  --    ABO TYPING  --  A   RH TYPING  --  Positive   ANTIBODY SCREEN  --  Negative         Brief Urine Lab Results     None        Microbiology Results (last 10 days)     ** No results found for the last 240 hours. **          CT Angiogram Neck    Result Date:  5/26/2023  Impression: 1.No hemodynamically significant stenosis, large vessel cut or aneurysms in intracranial circulation 2.No hemodynamically significant stenosis, dissection or aneurysms in extracranial circulation. 3.Ascending aortic root aneurysm measuring 4.2 cm in AP dimension. Electronically Signed: Rinku Sexton  5/26/2023 9:45 PM EDT  Workstation ID: [id number]    MRI Brain Without Contrast    Result Date: 5/27/2023  Impression: Impression: 1.Small subacute infarct within left insula, and tiny subacute infarcts within left caudate head and left high frontal lobe. Petechial staining within left caudate infarct, but no fadi hemorrhagic transformation. 2.Findings suggestive of minimal chronic small vessel ischemic disease. Electronically Signed: Juventino Bell  5/27/2023 4:53 PM EDT  Workstation ID: [id number]    XR Chest 1 View    Result Date: 5/26/2023  Impression: 1.Bibasilar opacities may represent atelectasis or infiltrates. 2.Cardiomegaly. Electronically Signed: Rinku Sexton  5/26/2023 10:30 PM EDT  Workstation ID: [id number]    CT Head Without Contrast Stroke Protocol    Result Date: 5/26/2023  Impression: 1.No acute intracranial hemorrhage. Calvarium is intact. Electronically Signed: Rinku Sexton  5/26/2023 9:22 PM EDT  Workstation ID: [id number]    CT Angiogram Head w AI Analysis of LVO    Result Date: 5/26/2023  Impression: 1.No hemodynamically significant stenosis, large vessel cut or aneurysms in intracranial circulation 2.No hemodynamically significant stenosis, dissection or aneurysms in extracranial circulation. 3.Ascending aortic root aneurysm measuring 4.2 cm in AP dimension. Electronically Signed: Rinku Sexton  5/26/2023 9:45 PM EDT  Workstation ID: [id number]    CT CEREBRAL PERFUSION WITH & WITHOUT CONTRAST    Result Date: 5/26/2023  Impression: Impression: 1.No core infarct is identified. 2.Findings raising suspicion for tissue at risk in left parietal lobe. MRI can be obtained for further  evaluation. Case discussed with Dr. TONY BURGER @ 5/26/2023 10:01 PM EDT. Electronically Signed: Rinku Carlie  5/26/2023 10:01 PM EDT  Workstation ID: [id number]    Results for orders placed during the hospital encounter of 05/26/23    Adult Transthoracic Echo Complete W/ Cont if Necessary Per Protocol    Interpretation Summary  •  Left ventricular systolic function is normal. Calculated left ventricular EF = 68% Left ventricular ejection fraction appears to be 66 - 70%.  •  Left ventricular diastolic dysfunction is noted.  •  The right ventricular cavity is dilated.  •  The left atrial cavity is dilated.  •  Saline test results are negative for right to left atrial level shunt.  •  Moderate aortic valve stenosis is present.  •  Estimated right ventricular systolic pressure from tricuspid regurgitation is mildly elevated (35-45 mmHg).  •  Mild pulmonary hypertension is present.    Consults:   Consults     Date and Time Order Name Status Description    5/27/2023  1:50 PM Inpatient Cardiology Consult Completed     5/26/2023  9:06 PM Inpatient Neurology Consult Stroke Completed     5/26/2023  9:06 PM Inpatient Neurology Consult Stroke          Discharge Details        Discharge Medications      New Medications      Instructions Start Date   clopidogrel 75 MG tablet  Commonly known as: PLAVIX   75 mg, Oral, Daily   Start Date: May 29, 2023     cyanocobalamin 1000 MCG/ML injection   1,000 mcg, Intramuscular, Every 28 Days   Start Date: June 25, 2023     pantoprazole 40 MG EC tablet  Commonly known as: Protonix   40 mg, Oral, Daily         Changes to Medications      Instructions Start Date   atorvastatin 40 MG tablet  Commonly known as: LIPITOR  What changed:   · medication strength  · how much to take   40 mg, Oral, Daily   Start Date: May 29, 2023     metoprolol succinate XL 50 MG 24 hr tablet  Commonly known as: TOPROL-XL  What changed:   · medication strength  · how much to take   50 mg, Oral, Daily   Start Date:  May 29, 2023        Continue These Medications      Instructions Start Date   aspirin 81 MG chewable tablet   1 tablet, Oral, Daily      lisinopril 10 MG tablet  Commonly known as: PRINIVIL,ZESTRIL   10 mg, Oral, Daily      sildenafil 100 MG tablet  Commonly known as: VIAGRA   100 mg, Oral             Allergies   Allergen Reactions   • Acetaminophen Unknown - Low Severity     Other reaction(s): Other (See Comments)  Due to liver failure in the past         Discharge Disposition:   Home or Self Care    Diet:  Hospital:  Diet Order   Procedures   • Diet: Regular/House Diet; Texture: Regular Texture (IDDSI 7); Fluid Consistency: Thin (IDDSI 0)       Discharge Activity:   Activity Instructions     Activity as Tolerated            CODE STATUS:  Code Status and Medical Interventions:   Ordered at: 05/26/23 0723     Code Status (Patient has no pulse and is not breathing):    CPR (Attempt to Resuscitate)     Medical Interventions (Patient has pulse or is breathing):    Full Support       No future appointments.    Additional Instructions for the Follow-ups that You Need to Schedule     Call MD With Problems / Concerns   As directed      Instructions: PCM vs appropriate specialist    Order Comments: Instructions: PCM vs appropriate specialist          Discharge Follow-up with PCP   As directed       Currently Documented PCP:    Mame Starks MD    PCP Phone Number:    [phone]     Follow Up Details: within one week of discharge         Discharge Follow-up with Specialty: Neurology and cardiology follow up as per their respective services. Outpatient transesophageal echocardiogram (HANNA) should be arranged with cardiology.   As directed      Specialty: Neurology and cardiology follow up as per their respective services. Outpatient transesophageal echocardiogram (HANNA) should be arranged with cardiology.             Time spent on Discharge including face to face service: 35 minutes    Signature: Electronically signed by  Ej Greene MD, 05/28/23, 11:56 EDT.  [facility] Hospitalist Team

## 2023-05-28 NOTE — CASE MANAGEMENT/SOCIAL WORK
Case Management Discharge Note      Final Note: home       Transportation Services  Private: Car    Final Discharge Disposition Code: 01 - home or self-care

## 2023-05-28 NOTE — PLAN OF CARE
Goal Outcome Evaluation:     Problem: Adult Inpatient Plan of Care  Goal: Plan of Care Review  Outcome: Ongoing, Progressing  Goal: Patient-Specific Goal (Individualized)  Outcome: Ongoing, Progressing  Goal: Absence of Hospital-Acquired Illness or Injury  Outcome: Ongoing, Progressing  Intervention: Identify and Manage Fall Risk  Recent Flowsheet Documentation  Taken 5/28/2023 0400 by Laurel Izquierdo RN  Safety Promotion/Fall Prevention:   safety round/check completed   room organization consistent   clutter free environment maintained  Taken 5/28/2023 0200 by Laurel Izquierdo RN  Safety Promotion/Fall Prevention:   safety round/check completed   room organization consistent   clutter free environment maintained  Intervention: Prevent Skin Injury  Recent Flowsheet Documentation  Taken 5/28/2023 0400 by Laurel Izquierdo RN  Body Position: position changed independently  Skin Protection: adhesive use limited  Intervention: Prevent Infection  Recent Flowsheet Documentation  Taken 5/28/2023 0400 by Laurel Izquierdo RN  Infection Prevention:   hand hygiene promoted   personal protective equipment utilized   rest/sleep promoted  Taken 5/28/2023 0200 by Laurel Izquierdo RN  Infection Prevention:   hand hygiene promoted   personal protective equipment utilized   rest/sleep promoted  Goal: Optimal Comfort and Wellbeing  Outcome: Ongoing, Progressing  Intervention: Provide Person-Centered Care  Recent Flowsheet Documentation  Taken 5/28/2023 0400 by Laurel Izquierdo RN  Trust Relationship/Rapport: care explained  Goal: Readiness for Transition of Care  Outcome: Ongoing, Progressing     Problem: Hypertension Comorbidity  Goal: Blood Pressure in Desired Range  Outcome: Ongoing, Progressing

## 2023-05-28 NOTE — NURSING NOTE
Pt transferred to room 255 for transfer of care, report called prior to transfer to nurse on oncoming unit.

## 2023-05-28 NOTE — PLAN OF CARE
Problem: Adult Inpatient Plan of Care  Goal: Plan of Care Review  Outcome: Ongoing, Progressing  Goal: Patient-Specific Goal (Individualized)  Outcome: Ongoing, Progressing  Goal: Absence of Hospital-Acquired Illness or Injury  Outcome: Ongoing, Progressing  Intervention: Identify and Manage Fall Risk  Recent Flowsheet Documentation  Taken 5/28/2023 1215 by Lawton, Jeanette A, LPN  Safety Promotion/Fall Prevention:   assistive device/personal items within reach   activity supervised   clutter free environment maintained   fall prevention program maintained   nonskid shoes/slippers when out of bed   room organization consistent   safety round/check completed  Taken 5/28/2023 1000 by Lawton, Jeanette A, LPN  Safety Promotion/Fall Prevention:   assistive device/personal items within reach   clutter free environment maintained   nonskid shoes/slippers when out of bed   room organization consistent   safety round/check completed  Taken 5/28/2023 0815 by Lawton, Jeanette A, LPN  Safety Promotion/Fall Prevention:   assistive device/personal items within reach   clutter free environment maintained   nonskid shoes/slippers when out of bed   room organization consistent   safety round/check completed  Intervention: Prevent Skin Injury  Recent Flowsheet Documentation  Taken 5/28/2023 0815 by Jeanette Win LPN  Skin Protection:   adhesive use limited   tubing/devices free from skin contact  Intervention: Prevent and Manage VTE (Venous Thromboembolism) Risk  Recent Flowsheet Documentation  Taken 5/28/2023 1215 by Jeanette Win LPN  VTE Prevention/Management:   bilateral   sequential compression devices off   patient refused intervention  Range of Motion: active ROM (range of motion) encouraged  Taken 5/28/2023 0815 by Jeanette Win LPN  Activity Management: up ad yvonne  VTE Prevention/Management:   bilateral   sequential compression devices off   patient refused intervention  Range of Motion: active ROM (range  of motion) encouraged  Intervention: Prevent Infection  Recent Flowsheet Documentation  Taken 5/28/2023 1215 by Jeanette Win LPN  Infection Prevention: hand hygiene promoted  Taken 5/28/2023 1000 by Jeanette Win LPN  Infection Prevention: hand hygiene promoted  Taken 5/28/2023 0815 by Jeanette Win LPN  Infection Prevention: hand hygiene promoted  Goal: Optimal Comfort and Wellbeing  Outcome: Ongoing, Progressing  Intervention: Provide Person-Centered Care  Recent Flowsheet Documentation  Taken 5/28/2023 1215 by Jeanette Win LPN  Trust Relationship/Rapport:   care explained   thoughts/feelings acknowledged  Taken 5/28/2023 0815 by Jeanette Win LPN  Trust Relationship/Rapport:   care explained   thoughts/feelings acknowledged  Goal: Readiness for Transition of Care  Outcome: Ongoing, Progressing     Problem: Hypertension Comorbidity  Goal: Blood Pressure in Desired Range  Outcome: Ongoing, Progressing  Intervention: Maintain Blood Pressure Management  Recent Flowsheet Documentation  Taken 5/28/2023 1215 by Jeanette Win LPN  Medication Review/Management: medications reviewed  Taken 5/28/2023 1000 by Jeanette Win LPN  Medication Review/Management: medications reviewed  Taken 5/28/2023 0815 by Jeanette Win LPN  Medication Review/Management: medications reviewed   Goal Outcome Evaluation:

## 2023-05-28 NOTE — PROGRESS NOTES
Referring Provider: Ej Greene MD    Reason for follow-up: Stroke/cardioembolic source     Patient Care Team:  Mame Starks MD as PCP - General (Internal Medicine)      SUBJECTIVE  Symptoms have resolved.  Sitting in bed.  Wife is at bedside and they have multiple questions regarding plans for follow-up.     ROS  Review of all systems negative except as indicated.    Since I have last seen, the patient has been without any chest discomfort, shortness of breath, palpitations, dizziness or syncope.  Denies having any headache, abdominal pain, nausea, vomiting, diarrhea, constipation, loss of weight or loss of appetite.  Denies having any excessive bruising, hematuria or blood in the stool.  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   • History of kidney stones 5/27/2023   • History of non-Hodgkin's lymphoma 5/27/2023   • Hypertension    • Immune deficiency disorder    • Kidney stone    • Macular degeneration    • Mild pulmonary hypertension 5/27/2023   • Mixed hyperlipidemia 5/27/2023   • Moderate aortic valve stenosis 5/27/2023   • Multi-organ failure with heart failure     history   • Non Hodgkin's lymphoma    • Obesity (BMI 30-39.9) 5/27/2023   • Radiation necrosis of skin and subcutaneous        Past Surgical History:   Procedure Laterality Date   • BONE MARROW TRANSPLANT     • CHOLECYSTECTOMY     • KNEE ASPIRATION     • SMALL INTESTINE SURGERY     • TOTAL SHOULDER REPLACEMENT         History reviewed. No pertinent family history.    Social History     Tobacco Use   • Smoking status: Never   • Smokeless tobacco: Never   Vaping Use   • Vaping Use: Never used   Substance Use Topics   •  "Alcohol use: Yes     Alcohol/week: 10.0 standard drinks     Types: 10 Cans of beer per week     Comment: daily   • Drug use: No        Medications Prior to Admission   Medication Sig Dispense Refill Last Dose   • aspirin 81 MG chewable tablet Chew 1 tablet Daily.      • atorvastatin (LIPITOR) 20 MG tablet Take 10 mg by mouth Daily.      • lisinopril (PRINIVIL,ZESTRIL) 10 MG tablet Take 10 mg by mouth Daily.      • metoprolol succinate XL (TOPROL-XL) 100 MG 24 hr tablet Take 100 mg by mouth Daily.      • sildenafil (VIAGRA) 100 MG tablet Take 100 mg by mouth.          Allergies:  Acetaminophen    Scheduled Meds:aspirin, 81 mg, Oral, Daily  atorvastatin, 40 mg, Oral, Daily  clopidogrel, 75 mg, Oral, Daily  lisinopril, 10 mg, Oral, Daily  metoprolol succinate XL, 50 mg, Oral, Daily  senna-docusate sodium, 2 tablet, Oral, BID  sodium chloride, 10 mL, Intravenous, Q12H      Continuous Infusions:   PRN Meds:.•  acetaminophen  •  senna-docusate sodium **AND** polyethylene glycol **AND** bisacodyl **AND** bisacodyl  •  nitroglycerin  •  sodium chloride  •  sodium chloride  •  sodium chloride      OBJECTIVE    Vital Signs  Vitals:    05/27/23 2300 05/28/23 0308 05/28/23 0500 05/28/23 0528   BP: 155/98 148/90  141/91   BP Location: Right arm Right arm  Right arm   Patient Position: Lying Lying  Lying   Pulse: 57 62  55   Resp: 20 14  12   Temp: 97.6 °F (36.4 °C) 97.5 °F (36.4 °C)  97.5 °F (36.4 °C)   TempSrc: Oral      SpO2: 99% 99%     Weight: 103 kg (226 lb 10.1 oz)  103 kg (226 lb 10.1 oz)    Height:           Flowsheet Rows      Flowsheet Row First Filed Value   Admission Height 177.8 cm (70\") Documented at 05/26/2023 2105   Admission Weight 106 kg (233 lb 11 oz) Documented at 05/26/2023 2058              Intake/Output Summary (Last 24 hours) at 5/28/2023 0832  Last data filed at 5/27/2023 2000  Gross per 24 hour   Intake 240 ml   Output --   Net 240 ml          Telemetry: Sinus rhythm    Physical Exam:  The patient is " alert, oriented and in no distress.  Vital signs as noted above.  Head and neck revealed no carotid bruits or jugular venous distention.  No thyromegaly or lymphadenopathy is present  Lungs clear.  No wheezing.  Breath sounds are normal bilaterally.  Heart normal first and second heart sounds.  No murmur. No precordial rub is present.  No gallop is present.  Abdomen soft and nontender.  No organomegaly is present.  Extremities with good peripheral pulses without any pedal edema.  Skin warm and dry.  Musculoskeletal system is grossly normal.  CNS grossly normal.       Results Review:  I have personally reviewed the results from the time of this admission to 5/28/2023 08:32 EDT and agree with these findings:  []  Laboratory  []  Microbiology  []  Radiology  []  EKG/Telemetry   []  Cardiology/Vascular   []  Pathology  []  Old records  []  Other:    Most notable findings include:    Lab Results (last 24 hours)       Procedure Component Value Units Date/Time    Folate [[id number]]  (Normal) Collected: 05/27/23 1415    Specimen: Blood Updated: 05/27/23 1750     Folate 8.41 ng/mL     Narrative:      Results may be falsely increased if patient taking Biotin.      Vitamin B12 [[id number]]  (Normal) Collected: 05/27/23 1415    Specimen: Blood Updated: 05/27/23 1750     Vitamin B-12 281 pg/mL     Narrative:      Results may be falsely increased if patient taking Biotin.      Extra Tubes [[id number]] Collected: 05/27/23 1415    Specimen: Blood, Venous Line Updated: 05/27/23 1515    Narrative:      The following orders were created for panel order Extra Tubes.  Procedure                               Abnormality         Status                     ---------                               -----------         ------                     Light Blue Top[[id number]]                                   Final result                 Please view results for these tests on the individual orders.    Light Blue Top [[id number]] Collected: 05/27/23  1415    Specimen: Blood Updated: 05/27/23 1515     Extra Tube Hold for add-ons.     Comment: Auto resulted       TSH [[id number]]  (Normal) Collected: 05/27/23 1415    Specimen: Blood Updated: 05/27/23 1456     TSH 3.770 uIU/mL     T4, Free [[id number]]  (Abnormal) Collected: 05/27/23 1415    Specimen: Blood Updated: 05/27/23 1456     Free T4 0.92 ng/dL     Narrative:      Results may be falsely increased if patient taking Biotin.      Lipid Panel [[id number]]  (Abnormal) Collected: 05/27/23 1415    Specimen: Blood Updated: 05/27/23 1450     Total Cholesterol 147 mg/dL      Triglycerides 317 mg/dL      HDL Cholesterol 40 mg/dL      LDL Cholesterol  58 mg/dL      VLDL Cholesterol 49 mg/dL      LDL/HDL Ratio 1.09    Narrative:      Cholesterol Reference Ranges  (U.S. Department of Health and Human Services ATP III Classifications)    Desirable          <200 mg/dL  Borderline High    200-239 mg/dL  High Risk          >240 mg/dL      Triglyceride Reference Ranges  (U.S. Department of Health and Human Services ATP III Classifications)    Normal           <150 mg/dL  Borderline High  150-199 mg/dL  High             200-499 mg/dL  Very High        >500 mg/dL    HDL Reference Ranges  (U.S. Department of Health and Human Services ATP III Classifications)    Low     <40 mg/dl (major risk factor for CHD)  High    >60 mg/dl ('negative' risk factor for CHD)        LDL Reference Ranges  (U.S. Department of Health and Human Services ATP III Classifications)    Optimal          <100 mg/dL  Near Optimal     100-129 mg/dL  Borderline High  130-159 mg/dL  High             160-189 mg/dL  Very High        >189 mg/dL    Hemoglobin A1c [[id number]]  (Abnormal) Collected: 05/27/23 1415    Specimen: Blood Updated: 05/27/23 1443     Hemoglobin A1C 6.20 %             Imaging Results (Last 24 Hours)       Procedure Component Value Units Date/Time    MRI Brain Without Contrast [[id number]] Collected: 05/27/23 1648     Updated: 05/27/23 1656     Narrative:      MRI BRAIN WO CONTRAST    Date of Exam: 5/27/2023 11:14 AM EDT    Indication: Stroke, follow up.     Comparison: CT head from May 26, 2023    Technique:  Routine multiplanar/multisequence sequence images of the brain were obtained without contrast administration.      Findings:  There is a small subacute infarct within the left insula, and tiny subacute infarcts within the left caudate head and left high frontal lobe. There is some petechial staining within the left caudate infarct, but no fadi hemorrhagic transformation. The   ventricles are stable in caliber, with no midline shift. The basal cisterns appear patent. Subtle foci of periventricular and subcortical white matter FLAIR hyperintensities are nonspecific, but likely the sequela of minimal chronic small vessel ischemic   disease.    The midline structures appear intact. The globes and orbits appear intact, with note of a right lens replacement. The intracranial vascular flow-voids appear patent.      Impression:      Impression:  1.Small subacute infarct within left insula, and tiny subacute infarcts within left caudate head and left high frontal lobe. Petechial staining within left caudate infarct, but no fadi hemorrhagic transformation.  2.Findings suggestive of minimal chronic small vessel ischemic disease.        Electronically Signed: Juventino Bell    5/27/2023 4:53 PM EDT    Workstation ID: [id number]            LAB RESULTS (LAST 7 DAYS)    CBC  Results from last 7 days   Lab Units 05/26/23  2115   WBC 10*3/mm3 6.60   RBC 10*6/mm3 4.48   HEMOGLOBIN g/dL 15.1   HEMATOCRIT % 46.4   MCV fL 103.5*   PLATELETS 10*3/mm3 148       BMP  Results from last 7 days   Lab Units 05/26/23  2115   SODIUM mmol/L 140   POTASSIUM mmol/L 4.1   CHLORIDE mmol/L 104   CO2 mmol/L 24.0   BUN mg/dL 22   CREATININE mg/dL 1.20   GLUCOSE mg/dL 86       CMP   Results from last 7 days   Lab Units 05/26/23  2115   SODIUM mmol/L 140   POTASSIUM mmol/L 4.1   CHLORIDE  mmol/L 104   CO2 mmol/L 24.0   BUN mg/dL 22   CREATININE mg/dL 1.20   GLUCOSE mg/dL 86   ALBUMIN g/dL 4.1   BILIRUBIN mg/dL 0.6   ALK PHOS U/L 87   AST (SGOT) U/L 36   ALT (SGPT) U/L 21   AMMONIA umol/L 18       BNP        TROPONIN  Results from last 7 days   Lab Units 05/27/23  0658   HSTROP T ng/L 25*       CoAg  Results from last 7 days   Lab Units 05/26/23  2115   INR  1.00   APTT seconds 26.1       Creatinine Clearance  Estimated Creatinine Clearance: 70.8 mL/min (by C-G formula based on SCr of 1.2 mg/dL).    ABG        Radiology  CT Angiogram Neck    Result Date: 5/26/2023  1.No hemodynamically significant stenosis, large vessel cut or aneurysms in intracranial circulation 2.No hemodynamically significant stenosis, dissection or aneurysms in extracranial circulation. 3.Ascending aortic root aneurysm measuring 4.2 cm in AP dimension. Electronically Signed: Rinku Sexton  5/26/2023 9:45 PM EDT  Workstation ID: [id number]    MRI Brain Without Contrast    Result Date: 5/27/2023  Impression: 1.Small subacute infarct within left insula, and tiny subacute infarcts within left caudate head and left high frontal lobe. Petechial staining within left caudate infarct, but no fadi hemorrhagic transformation. 2.Findings suggestive of minimal chronic small vessel ischemic disease. Electronically Signed: Juventino Bell  5/27/2023 4:53 PM EDT  Workstation ID: [id number]    XR Chest 1 View    Result Date: 5/26/2023  1.Bibasilar opacities may represent atelectasis or infiltrates. 2.Cardiomegaly. Electronically Signed: Rinku Sexton  5/26/2023 10:30 PM EDT  Workstation ID: [id number]    CT Head Without Contrast Stroke Protocol    Result Date: 5/26/2023  1.No acute intracranial hemorrhage. Calvarium is intact. Electronically Signed: Rinku Sexton  5/26/2023 9:22 PM EDT  Workstation ID: [id number]    CT Angiogram Head w AI Analysis of LVO    Result Date: 5/26/2023  1.No hemodynamically significant stenosis, large vessel cut or aneurysms in  intracranial circulation 2.No hemodynamically significant stenosis, dissection or aneurysms in extracranial circulation. 3.Ascending aortic root aneurysm measuring 4.2 cm in AP dimension. Electronically Signed: Rinuk Sexton  5/26/2023 9:45 PM EDT  Workstation ID: [id number]    CT CEREBRAL PERFUSION WITH & WITHOUT CONTRAST    Result Date: 5/26/2023  Impression: 1.No core infarct is identified. 2.Findings raising suspicion for tissue at risk in left parietal lobe. MRI can be obtained for further evaluation. Case discussed with Dr. TONY ANGELA @ 5/26/2023 10:01 PM EDT. Electronically Signed: Rinku Sexton  5/26/2023 10:01 PM EDT  Workstation ID: [id number]        EKG  I personally viewed and interpreted the patient's EKG/Telemetry data:  ECG 12 Lead Stroke Evaluation   Final Result   HEART RATE= 74  bpm   RR Interval= 820  ms   SC Interval= 231  ms   P Horizontal Axis= -44  deg   P Front Axis= 64  deg   QRSD Interval= 89  ms   QT Interval= 408  ms   QRS Axis= 8  deg   T Wave Axis= 29  deg   - ABNORMAL ECG -   Sinus rhythm, prev SR 05/10/2018   Multiple premature complexes, vent & supraven, hx prev PAC   Prolonged SC interval, since 05/10/2018   Inferior infarct, old   When compared with ECG of 10-May-2018 18:58:58,   New conduction abnormality   Electronically Signed By: Tony Angela ([place]) 27-May-2023 08:49:44   Date and Time of Study: 2023-05-26 21:34:25            Echocardiogram:    Results for orders placed during the hospital encounter of 05/26/23    Adult Transthoracic Echo Complete W/ Cont if Necessary Per Protocol    Interpretation Summary  •  Left ventricular systolic function is normal. Calculated left ventricular EF = 68% Left ventricular ejection fraction appears to be 66 - 70%.  •  Left ventricular diastolic dysfunction is noted.  •  The right ventricular cavity is dilated.  •  The left atrial cavity is dilated.  •  Saline test results are negative for right to left atrial level shunt.  •  Moderate aortic  valve stenosis is present.  •  Estimated right ventricular systolic pressure from tricuspid regurgitation is mildly elevated (35-45 mmHg).  •  Mild pulmonary hypertension is present.        Stress Test:         Cardiac Catheterization:  No results found for this or any previous visit.         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  CTA with no hemodynamically significant stenosis.  MRI shows small subacute infarct within left insula and tiny subacute infarct within the left caudate head and left high frontal lobe.  He has been started on dual antiplatelet therapy, high intensity statin  Plan for outpatient transesophageal echocardiogram  I have provided 30 days MCOT to evaluate for possible atrial fibrillation     Aortic stenosis and ascending aortic aneurysm  Echocardiogram shows EF of 68%, diastolic dysfunction.  Bubble study is negative however he has calcified and restricted opening of aortic valve consistent with aortic stenosis.  CTA also showed ascending aortic aneurysm 4.2 cm.  He will need a transesophageal echocardiogram to further evaluate the aortic valve and to rule out cardioembolic source for stroke.  Started Jardiance for HFpEF     Hypertension  Needs better blood pressure control.  He has been started on ACE inhibitor and beta-blocker.  Needs better blood pressure and heart rate control due to findings of aortic aneurysm.  Bradycardia is new for him and M cot will assess heart rate trend.     Hyperlipidemia  LDL 58, HDL 40, total cholesterol 147.  Started on high intensity statin.  LDL goal is less than 70.     Diabetes  A1c 6.2  He  should be started on metformin.  Consider starting Jardiance     Obesity  Diet, exercise, weight loss and lifestyle modification discussed with the patient.  Screening and treatment of sleep apnea.  Obesity is contributing to HFpEF.      Denton Kirkland MD  05/28/23  08:32 EDT

## 2023-05-29 ENCOUNTER — READMISSION MANAGEMENT (OUTPATIENT)
Dept: CALL CENTER | Facility: HOSPITAL | Age: 68
End: 2023-05-29

## 2023-05-29 NOTE — OUTREACH NOTE
Prep Survey    Flowsheet Row Responses   [facility]? [facility]   Is LACE score < 7 ? No   Eligibility Readm Mgmt   Discharge diagnosis TIA   Does the patient have one of the following disease processes/diagnoses(primary or secondary)? Other   Does the patient have Home health ordered? No   Is there a DME ordered? No   Prep survey completed? Yes          Oliva HIDALGO - Registered Nurse

## 2023-06-07 ENCOUNTER — READMISSION MANAGEMENT (OUTPATIENT)
Dept: CALL CENTER | Facility: HOSPITAL | Age: 68
End: 2023-06-07
Payer: MEDICARE

## 2023-06-07 NOTE — OUTREACH NOTE
Medical Week 2 Survey      Flowsheet Row Responses   [facility] patient discharged from? [facility]   Does the patient have one of the following disease processes/diagnoses(primary or secondary)? Other   Week 2 attempt successful? Yes   Call start time 0939   Discharge diagnosis TIA   Call end time 0940   Meds reviewed with patient/caregiver? Yes   Is the patient having any side effects they believe may be caused by any medication additions or changes? No   Does the patient have all medications ordered at discharge? Yes   Is the patient taking all medications as directed (includes completed medication regime)? Yes   Comments regarding appointments Cards apt on 6/12/23   Does the patient have a primary care provider?  Yes   Has the patient kept scheduled appointments due by today? Yes  [PCP apt ]   Has home health visited the patient within 72 hours of discharge? N/A   Psychosocial issues? No   Did the patient receive a copy of their discharge instructions? Yes   Nursing interventions Reviewed instructions with patient   What is the patient's perception of their health status since discharge? Improving   Is the patient/caregiver able to teach back signs and symptoms related to disease process for when to call PCP? Yes   Is the patient/caregiver able to teach back signs and symptoms related to disease process for when to call 911? Yes   Is the patient/caregiver able to teach back the hierarchy of who to call/visit for symptoms/problems? PCP, Specialist, Home health nurse, Urgent Care, ED, 911 Yes   If the patient is a current smoker, are they able to teach back resources for cessation? Not a smoker   Week 2 Call Completed? Yes   Graduated Yes   Did the patient feel the follow up calls were helpful during their recovery period? Yes   Graduated/Revoked comments Pt states he is improving-has had a PCP apt since hospital discharge.  Cards apt on 6/12/23.            Ivy ANDERS - Registered Nurse

## 2023-06-11 NOTE — PROGRESS NOTES
Encounter Date:06/12/2023        Patient ID: Juarez Christina is a 67 y.o. male.      Chief Complaint:      History of Present Illness  67 years old man with hypertension, hyperlipidemia, aortic aneurysm, diabetes who was recently in the hospital with strokelike symptoms MRI showed small subacute infarct within left insula and tiny subacute infarct within the left caudate head and left high frontal lobe.  He was started on dual antiplatelet therapy, high intensity statin.  An echocardiogram suggested normal LV function with at least moderate aortic stenosis.  He was recommended to undergo an outpatient transesophageal echocardiogram to further evaluate his aortic valve and to also look for cardioembolic source for stroke.  MCOT was also provided to him to look for atrial fibrillation since he has risk factors for arrhythmia.  Today he returns feeling well.  He has resumed all his prestroke activities with no limitations.  He denies any chest pain or shortness of breath.    The following portions of the patient's history were reviewed and updated as appropriate: allergies, current medications, past family history, past medical history, past social history, past surgical history, and problem list.    Review of Systems   Constitutional: Positive for malaise/fatigue.   Cardiovascular:  Negative for chest pain, dyspnea on exertion, leg swelling and palpitations.   Respiratory:  Negative for cough and shortness of breath.    Gastrointestinal:  Negative for abdominal pain, nausea and vomiting.   Neurological:  Positive for numbness. Negative for dizziness, focal weakness, headaches and light-headedness.   All other systems reviewed and are negative.      Current Outpatient Medications:     aspirin 81 MG chewable tablet, Chew 1 tablet Daily., Disp: , Rfl:     atorvastatin (LIPITOR) 40 MG tablet, Take 1 tablet by mouth Daily., Disp: 90 tablet, Rfl: 3    clopidogrel (PLAVIX) 75 MG tablet, Take 1 tablet by mouth Daily.,  Disp: 90 tablet, Rfl: 3    [START ON 6/25/2023] cyanocobalamin 1000 MCG/ML injection, Inject 1 mL into the appropriate muscle as directed by prescriber Every 28 (Twenty-Eight) Days., Disp: 1 mL, Rfl: 5    lisinopril (PRINIVIL,ZESTRIL) 10 MG tablet, Take 1 tablet by mouth Daily., Disp: , Rfl:     metoprolol succinate XL (TOPROL-XL) 50 MG 24 hr tablet, Take 1 tablet by mouth Daily., Disp: 90 tablet, Rfl: 3    multivitamin with minerals (ICAPS) tablet tablet, Take 1 tablet by mouth Daily., Disp: , Rfl:     sildenafil (VIAGRA) 100 MG tablet, Take 1 tablet by mouth., Disp: , Rfl:     Current outpatient and discharge medications have been reconciled for the patient.  Reviewed by: Denton Kirkland MD       No Known Allergies      Family History   Problem Relation Age of Onset    Heart disease Mother     Hypertension Mother     Heart disease Father     Hypertension Father     Heart attack Maternal Grandfather     Asthma Brother     Heart disease Brother     Hypertension Brother     Heart disease Brother     Hypertension Brother     Hypertension Brother        Past Surgical History:   Procedure Laterality Date    BONE MARROW TRANSPLANT      CARDIAC CATHETERIZATION  1989    CHOLECYSTECTOMY      KNEE ASPIRATION      SMALL INTESTINE SURGERY      TOTAL SHOULDER REPLACEMENT         Past Medical History:   Diagnosis Date    Abnormal ECG 2021    Acute [id number] disease     Acute ischemic left MCA stroke 05/28/2023    Arrhythmia 2021    Cancer     Cerebrovascular small vessel disease 05/28/2023    Essential hypertension 05/27/2023    Expressive aphasia 05/27/2023    Fatty liver 05/27/2023    H/O acute respiratory failure     Hepatitis     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    History of kidney stones 05/27/2023    History of non-Hodgkin's lymphoma 05/27/2023    Hypertension     Immune deficiency disorder     Kidney stone     Macular degeneration     Mild pulmonary  "hypertension 05/27/2023    Mixed hyperlipidemia 05/27/2023    Moderate aortic valve stenosis 05/27/2023    Multi-organ failure with heart failure     history    Non Hodgkin's lymphoma     Obesity (BMI 30-39.9) 05/27/2023    Prediabetes 05/28/2023    Radiation necrosis of skin and subcutaneous     Sleep apnea 2003       Family History   Problem Relation Age of Onset    Heart disease Mother     Hypertension Mother     Heart disease Father     Hypertension Father     Heart attack Maternal Grandfather     Asthma Brother     Heart disease Brother     Hypertension Brother     Heart disease Brother     Hypertension Brother     Hypertension Brother        Social History     Socioeconomic History    Marital status:    Tobacco Use    Smoking status: Never    Smokeless tobacco: Never   Vaping Use    Vaping Use: Never used   Substance and Sexual Activity    Alcohol use: Yes     Alcohol/week: 10.0 standard drinks     Types: 10 Cans of beer per week     Comment: daily    Drug use: No    Sexual activity: Yes     Partners: Female               Objective:       Physical Exam    /68 (BP Location: Left arm, Patient Position: Sitting, Cuff Size: Large Adult)   Pulse 72   Ht 177.8 cm (70\")   Wt 105 kg (231 lb)   SpO2 96%   BMI 33.15 kg/m²   The patient is alert, oriented and in no distress.    Vital signs as noted above.    Head and neck revealed no carotid bruits or jugular venous distension.  No thyromegaly or lymphadenopathy is present.    Lungs clear.  No wheezing.  Breath sounds are normal bilaterally.    Heart normal first and second heart sounds.  No murmur..  No pericardial rub is present.  No gallop is present.    Abdomen soft and nontender.  No organomegaly is present.    Extremities revealed good peripheral pulses without any pedal edema.    Skin warm and dry.    Musculoskeletal system is grossly normal.    CNS grossly normal.           Diagnosis Plan   1. Transient ischemic attack        2. Aortic root " aneurysm        3. Essential hypertension        4. Mixed hyperlipidemia        5. Fatty liver        6. Moderate aortic valve stenosis        7. Prediabetes        8. Obesity (BMI 30-39.9)        9. History of non-Hodgkin's lymphoma        LAB RESULTS (LAST 7 DAYS)    CBC        BMP        CMP         BNP        TROPONIN        CoAg        Creatinine Clearance  Estimated Creatinine Clearance: 71.5 mL/min (by C-G formula based on SCr of 1.2 mg/dL).    ABG        Radiology  No radiology results for the last day    EKG  Procedures    Stress test      Echocardiogram  Results for orders placed during the hospital encounter of 05/26/23    Adult Transthoracic Echo Complete W/ Cont if Necessary Per Protocol    Interpretation Summary    Left ventricular systolic function is normal. Calculated left ventricular EF = 68% Left ventricular ejection fraction appears to be 66 - 70%.    Left ventricular diastolic dysfunction is noted.    The right ventricular cavity is dilated.    The left atrial cavity is dilated.    Saline test results are negative for right to left atrial level shunt.    Moderate aortic valve stenosis is present.    Estimated right ventricular systolic pressure from tricuspid regurgitation is mildly elevated (35-45 mmHg).    Mild pulmonary hypertension is present.      Cardiac catheterization  No results found for this or any previous visit.          Assessment and Plan       Diagnoses and all orders for this visit:    1. Transient ischemic attack (Primary)    2. Aortic root aneurysm    3. Essential hypertension    4. Mixed hyperlipidemia    5. Fatty liver    6. Moderate aortic valve stenosis    7. Prediabetes    8. Obesity (BMI 30-39.9)    9. History of non-Hodgkin's lymphoma       Stroke  CT head with no acute intracranial abnormalities.  CTA with no hemodynamically significant stenosis.  MRI shows small subacute infarct within left insula and tiny subacute infarct within the left caudate head and left high  frontal lobe.  He has been started on dual antiplatelet therapy, high intensity statin  Plan for outpatient transesophageal echocardiogram by Dr. Cantu.  I have provided 30 days MCOT to evaluate for possible atrial fibrillation.     Aortic stenosis and ascending aortic aneurysm  Echocardiogram shows EF of 68%, diastolic dysfunction.  Bubble study is negative however he has calcified and restricted opening of aortic valve consistent with aortic stenosis.  CTA also showed ascending aortic aneurysm 4.2 cm.  He will need a transesophageal echocardiogram to further evaluate the aortic valve and to rule out cardioembolic source for stroke.  Started Jardiance for HFpEF     Hypertension  Needs better blood pressure control.  He has been started on ACE inhibitor and beta-blocker.  Needs better blood pressure and heart rate control due to findings of aortic aneurysm.  Bradycardia is new for him and M cot will assess heart rate trend.     Hyperlipidemia  LDL 58, HDL 40, total cholesterol 147.  Started on high intensity statin.  LDL goal is less than 70.     Diabetes  A1c 6.2  He should be started on metformin.  Consider starting Jardiance     Obesity  Diet, exercise, weight loss and lifestyle modification discussed with the patient.  Screening and treatment of sleep apnea.  Obesity is contributing to HFpEF.

## 2023-06-12 ENCOUNTER — OFFICE VISIT (OUTPATIENT)
Dept: CARDIOLOGY | Facility: CLINIC | Age: 68
End: 2023-06-12
Payer: MEDICARE

## 2023-06-12 VITALS
SYSTOLIC BLOOD PRESSURE: 133 MMHG | WEIGHT: 231 LBS | HEIGHT: 70 IN | DIASTOLIC BLOOD PRESSURE: 68 MMHG | OXYGEN SATURATION: 96 % | BODY MASS INDEX: 33.07 KG/M2 | HEART RATE: 72 BPM

## 2023-06-12 DIAGNOSIS — E66.9 OBESITY (BMI 30-39.9): Chronic | ICD-10-CM

## 2023-06-12 DIAGNOSIS — R73.03 PREDIABETES: Chronic | ICD-10-CM

## 2023-06-12 DIAGNOSIS — I35.0 MODERATE AORTIC VALVE STENOSIS: Chronic | ICD-10-CM

## 2023-06-12 DIAGNOSIS — G45.9 TRANSIENT ISCHEMIC ATTACK: Primary | ICD-10-CM

## 2023-06-12 DIAGNOSIS — I71.21 AORTIC ROOT ANEURYSM: ICD-10-CM

## 2023-06-12 DIAGNOSIS — Z85.72 HISTORY OF NON-HODGKIN'S LYMPHOMA: Chronic | ICD-10-CM

## 2023-06-12 DIAGNOSIS — K76.0 FATTY LIVER: Chronic | ICD-10-CM

## 2023-06-12 DIAGNOSIS — E78.2 MIXED HYPERLIPIDEMIA: Chronic | ICD-10-CM

## 2023-06-12 DIAGNOSIS — I10 ESSENTIAL HYPERTENSION: Chronic | ICD-10-CM

## 2023-06-12 RX ORDER — MULTIPLE VITAMINS W/ MINERALS TAB 9MG-400MCG
1 TAB ORAL DAILY
COMMUNITY

## 2023-06-20 ENCOUNTER — TELEPHONE (OUTPATIENT)
Dept: CARDIOLOGY | Facility: CLINIC | Age: 68
End: 2023-06-20

## 2023-06-20 NOTE — TELEPHONE ENCOUNTER
[facility] received a fax that requires your attention. The document has been indexed to the patient’s chart for your review.      Reason for sending: EXTERNAL MEDICAL RECORD NOTIFICATION     Documents Description: PROGRESS NOTE FROM 6.14.23 VISIT     Name of Sender: MICHELLE LOU MD- NEUROLOGY AND CLINICAL NEURPHYSIOLOGY     Date Indexed: 6.20.23

## 2023-07-28 ENCOUNTER — ANTICOAGULATION VISIT (OUTPATIENT)
Dept: CARDIOLOGY | Facility: CLINIC | Age: 68
End: 2023-07-28
Payer: MEDICARE

## 2023-07-28 ENCOUNTER — TELEPHONE (OUTPATIENT)
Dept: CARDIOLOGY | Facility: CLINIC | Age: 68
End: 2023-07-28

## 2023-07-28 VITALS — DIASTOLIC BLOOD PRESSURE: 75 MMHG | SYSTOLIC BLOOD PRESSURE: 145 MMHG | HEART RATE: 46 BPM | OXYGEN SATURATION: 100 %

## 2023-07-28 DIAGNOSIS — Z79.01 LONG TERM (CURRENT) USE OF ANTICOAGULANTS: ICD-10-CM

## 2023-07-28 DIAGNOSIS — I48.11 LONGSTANDING PERSISTENT ATRIAL FIBRILLATION: Primary | ICD-10-CM

## 2023-07-28 PROBLEM — I48.91 ATRIAL FIBRILLATION: Status: ACTIVE | Noted: 2023-07-28

## 2023-07-28 LAB — INR PPP: 1.4 (ref 2–3)

## 2023-07-28 NOTE — TELEPHONE ENCOUNTER
Pt in for protime today HR was 46-49 pt on Metoprolol 50mg daily. Pt denies dizziness, light headedness or lethargy. Pt states he does feel dizzy at times when he makes quick position changes. Discussed with Dr. Kirkland pt follows up in office next week advised decrease metoprolol to 25mg daily keep a log and return it to the next visit apLPN

## 2023-08-06 NOTE — PROGRESS NOTES
Encounter Date:08/07/2023        Patient ID: Juarez Christina is a 68 y.o. male.      Chief Complaint:      History of Present Illness  68 years old man with hypertension, hyperlipidemia, aortic aneurysm, diabetes who was recently in the hospital with strokelike symptoms MRI showed small subacute infarct within left insula and tiny subacute infarct within the left caudate head and left high frontal lobe.  He was started on dual antiplatelet therapy, high intensity statin.  An echocardiogram suggested normal LV function with at least moderate aortic stenosis.  He was recommended to undergo an outpatient transesophageal echocardiogram to further evaluate his aortic valve and to also look for cardioembolic source for stroke.  MCOT was also provided to him to look for atrial fibrillation since he has risk factors for arrhythmia.  Today he returns feeling well.  He has resumed all his prestroke activities with no limitations.  He is able to mow his lawn go up and down stairs with no problems.  He denies any chest pain or shortness of breath    The following portions of the patient's history were reviewed and updated as appropriate: allergies, current medications, past family history, past medical history, past social history, past surgical history, and problem list.    Review of Systems   Constitutional: Negative for malaise/fatigue.   Cardiovascular:  Negative for chest pain, dyspnea on exertion, leg swelling and palpitations.   Respiratory:  Negative for cough and shortness of breath.    Gastrointestinal:  Negative for abdominal pain, nausea and vomiting.   Neurological:  Positive for dizziness, light-headedness and numbness. Negative for focal weakness and headaches.   All other systems reviewed and are negative.      Current Outpatient Medications:     aspirin 81 MG chewable tablet, Chew 1 tablet Daily., Disp: , Rfl:     atorvastatin (LIPITOR) 40 MG tablet, Take 1 tablet by mouth Daily., Disp: 90 tablet, Rfl: 3     cyanocobalamin 1000 MCG/ML injection, Inject 1 mL into the appropriate muscle as directed by prescriber Every 28 (Twenty-Eight) Days., Disp: 1 mL, Rfl: 5    lisinopril (PRINIVIL,ZESTRIL) 10 MG tablet, Take 1 tablet by mouth Daily., Disp: , Rfl:     metoprolol succinate XL (TOPROL-XL) 50 MG 24 hr tablet, Take 1 tablet by mouth Daily., Disp: 90 tablet, Rfl: 3    multivitamin with minerals tablet tablet, Take 1 tablet by mouth Daily., Disp: , Rfl:     sildenafil (VIAGRA) 100 MG tablet, Take 1 tablet by mouth., Disp: , Rfl:     warfarin (COUMADIN) 5 MG tablet, Take 1 tablet by mouth Daily., Disp: 30 tablet, Rfl: 0    Current outpatient and discharge medications have been reconciled for the patient.  Reviewed by: Denton Kirkland MD       No Known Allergies    Family History   Problem Relation Age of Onset    Heart disease Mother     Hypertension Mother     Heart disease Father     Hypertension Father     Heart attack Maternal Grandfather     Asthma Brother     Heart disease Brother     Hypertension Brother     Heart disease Brother     Hypertension Brother     Hypertension Brother        Past Surgical History:   Procedure Laterality Date    BONE MARROW TRANSPLANT      CARDIAC CATHETERIZATION  1989    CHOLECYSTECTOMY      KNEE ASPIRATION      SMALL INTESTINE SURGERY      TOTAL SHOULDER REPLACEMENT         Past Medical History:   Diagnosis Date    Abnormal ECG 2021    Acute [id number]-bjcf disease     Acute ischemic left MCA stroke 05/28/2023    Arrhythmia 2021    Atrial fibrillation 7/28/2023    Cancer     Cerebrovascular small vessel disease 05/28/2023    Essential hypertension 05/27/2023    Expressive aphasia 05/27/2023    Fatty liver 05/27/2023    H/O acute respiratory failure     Hepatitis     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    History of kidney stones 05/27/2023    History of non-Hodgkin's lymphoma 05/27/2023    Hypertension     Immune deficiency disorder  "    Kidney stone     Macular degeneration     Mild pulmonary hypertension 05/27/2023    Mixed hyperlipidemia 05/27/2023    Moderate aortic valve stenosis 05/27/2023    Multi-organ failure with heart failure     history    Non Hodgkin's lymphoma     Obesity (BMI 30-39.9) 05/27/2023    Prediabetes 05/28/2023    Radiation necrosis of skin and subcutaneous     Sleep apnea 2003       Family History   Problem Relation Age of Onset    Heart disease Mother     Hypertension Mother     Heart disease Father     Hypertension Father     Heart attack Maternal Grandfather     Asthma Brother     Heart disease Brother     Hypertension Brother     Heart disease Brother     Hypertension Brother     Hypertension Brother        Social History     Socioeconomic History    Marital status:    Tobacco Use    Smoking status: Never    Smokeless tobacco: Never   Vaping Use    Vaping Use: Never used   Substance and Sexual Activity    Alcohol use: Yes     Alcohol/week: 10.0 standard drinks     Types: 10 Cans of beer per week     Comment: daily    Drug use: No    Sexual activity: Yes     Partners: Female               Objective:       Physical Exam    /71 (BP Location: Left arm, Patient Position: Sitting, Cuff Size: Large Adult)   Pulse 67   Ht 175.3 cm (69\")   Wt 104 kg (229 lb)   SpO2 96%   BMI 33.82 kg/mý   The patient is alert, oriented and in no distress.    Vital signs as noted above.    Head and neck revealed no carotid bruits or jugular venous distension.  No thyromegaly or lymphadenopathy is present.    Lungs clear.  No wheezing.  Breath sounds are normal bilaterally.    Heart normal first and second heart sounds.  No murmur..  No pericardial rub is present.  No gallop is present.    Abdomen soft and nontender.  No organomegaly is present.    Extremities revealed good peripheral pulses without any pedal edema.    Skin warm and dry.    Musculoskeletal system is grossly normal.    CNS grossly normal.           Diagnosis " Plan   1. Paroxysmal atrial fibrillation        2. Acute ischemic left MCA stroke        3. Aortic root aneurysm        4. Moderate aortic valve stenosis        5. Transient ischemic attack        6. Essential hypertension        7. Mixed hyperlipidemia        8. Obesity (BMI 30-39.9)        9. Prediabetes        10. History of non-Hodgkin's lymphoma        11. Fatty liver        12. B12 deficiency  cyanocobalamin 1000 MCG/ML injection      LAB RESULTS (LAST 7 DAYS)    CBC        BMP        CMP         BNP        TROPONIN        CoAg        Creatinine Clearance  CrCl cannot be calculated (Patient's most recent lab result is older than the maximum 30 days allowed.).    ABG        Radiology  No radiology results for the last day    EKG  Procedures    Stress test      Echocardiogram  Results for orders placed during the hospital encounter of 06/26/23    Adult Transesophageal Echo (HANNA) W/ Cont if Necessary Per Protocol    Interpretation Summary    Left ventricular systolic function is low normal. Left ventricular ejection fraction appears to be 51 - 55%.    The left atrial cavity is moderately dilated.    Saline test results are negative.    Mild aortic valve stenosis is present.    Moderate mitral valve regurgitation is present.      Cardiac catheterization  No results found for this or any previous visit.          Assessment and Plan       Diagnoses and all orders for this visit:    1. Paroxysmal atrial fibrillation (Primary)    2. Acute ischemic left MCA stroke    3. Aortic root aneurysm    4. Moderate aortic valve stenosis    5. Transient ischemic attack    6. Essential hypertension    7. Mixed hyperlipidemia    8. Obesity (BMI 30-39.9)    9. Prediabetes    10. History of non-Hodgkin's lymphoma    11. Fatty liver    12. B12 deficiency  -     cyanocobalamin 1000 MCG/ML injection; Inject 1 mL into the appropriate muscle as directed by prescriber Every 28 (Twenty-Eight) Days.  Dispense: 1 mL; Refill: 5            Stroke  CT head with no acute intracranial abnormalities.  CTA with no hemodynamically significant stenosis.  MRI shows small subacute infarct within left insula and tiny subacute infarct within the left caudate head and left high frontal lobe.  HANNA showed mild aortic stenosis and moderate mitral regurgitation with enlarged left atrium.  MCOT confirmed atrial fibrillation which could have contributed to stroke.    Atrial fibrillation (paroxysmal)  Paroxysmal atrial fibrillation diagnosed with MCOT  Patient was monitored for 27 days 15 hours and 29 minutes. Predominant rhythm was sinus. Average heart rate 68 bpm, lowest 50 and highest 151 bpm. Atrial fibrillation noted with a total burden of 3%. First-degree AV block at 55 bpm. No SVT or pauses. 14% PACs and 1% PVCs. 8 beat run of VT at 151 bpm. Clinical correlation is required. Consider anticoagulation for stroke prevention in the setting of atrial fibrillation.   [id number] score is 5  He has been started on warfarin.  Goal INR 2-3  For rate control he has been started on metoprolol.  Dose has been reduced due to persistent bradycardia.     Aortic stenosis and ascending aortic aneurysm  Echocardiogram shows EF of 68%, diastolic dysfunction.  Bubble study is negative however he has calcified and restricted opening of aortic valve consistent with aortic stenosis.  CTA also showed ascending aortic aneurysm 4.2 cm.  HANNA showed mild aortic stenosis and moderate mitral regurgitation  Started Jardiance for HFpEF     Hypertension  Needs better blood pressure control.  He has been started on ACE inhibitor and beta-blocker.  Needs better blood pressure and heart rate control due to findings of aortic aneurysm.  Bradycardia is new for him and MCOT showed a lowest heart rate of 50 bpm  Metoprolol dose has been reduced     Hyperlipidemia  LDL 58, HDL 40, total cholesterol 147.  Continue high intensity statin  LDL goal is less than 70.     Diabetes  A1c 6.2  He should be  started on metformin.  Consider starting Jardiance during next visit     Obesity  Diet, exercise, weight loss and lifestyle modification discussed with the patient.  Screening and treatment of sleep apnea.  Obesity is contributing to HFpEF.    He may proceed with colonoscopy with no further cardiac workup.  I have instructed him to stop warfarin 5 days prior to colonoscopy.    I have refilled his cyanocobalamin injection today

## 2023-08-07 ENCOUNTER — OFFICE VISIT (OUTPATIENT)
Dept: CARDIOLOGY | Facility: CLINIC | Age: 68
End: 2023-08-07
Payer: MEDICARE

## 2023-08-07 ENCOUNTER — ANTICOAGULATION VISIT (OUTPATIENT)
Dept: CARDIOLOGY | Facility: CLINIC | Age: 68
End: 2023-08-07
Payer: MEDICARE

## 2023-08-07 VITALS
SYSTOLIC BLOOD PRESSURE: 117 MMHG | WEIGHT: 229 LBS | HEIGHT: 69 IN | OXYGEN SATURATION: 96 % | DIASTOLIC BLOOD PRESSURE: 71 MMHG | HEART RATE: 67 BPM | BODY MASS INDEX: 33.92 KG/M2

## 2023-08-07 DIAGNOSIS — K76.0 FATTY LIVER: ICD-10-CM

## 2023-08-07 DIAGNOSIS — E66.9 OBESITY (BMI 30-39.9): ICD-10-CM

## 2023-08-07 DIAGNOSIS — I48.11 LONGSTANDING PERSISTENT ATRIAL FIBRILLATION: Primary | ICD-10-CM

## 2023-08-07 DIAGNOSIS — I10 ESSENTIAL HYPERTENSION: ICD-10-CM

## 2023-08-07 DIAGNOSIS — I48.0 PAROXYSMAL ATRIAL FIBRILLATION: Primary | ICD-10-CM

## 2023-08-07 DIAGNOSIS — E78.2 MIXED HYPERLIPIDEMIA: ICD-10-CM

## 2023-08-07 DIAGNOSIS — Z79.01 LONG TERM (CURRENT) USE OF ANTICOAGULANTS: ICD-10-CM

## 2023-08-07 DIAGNOSIS — I35.0 MODERATE AORTIC VALVE STENOSIS: ICD-10-CM

## 2023-08-07 DIAGNOSIS — I71.21 AORTIC ROOT ANEURYSM: ICD-10-CM

## 2023-08-07 DIAGNOSIS — G45.9 TRANSIENT ISCHEMIC ATTACK: ICD-10-CM

## 2023-08-07 DIAGNOSIS — I63.512 ACUTE ISCHEMIC LEFT MCA STROKE: ICD-10-CM

## 2023-08-07 DIAGNOSIS — E53.8 B12 DEFICIENCY: ICD-10-CM

## 2023-08-07 DIAGNOSIS — Z85.72 HISTORY OF NON-HODGKIN'S LYMPHOMA: ICD-10-CM

## 2023-08-07 DIAGNOSIS — R73.03 PREDIABETES: ICD-10-CM

## 2023-08-07 LAB — INR PPP: 1.3 (ref 2–3)

## 2023-08-07 PROCEDURE — 1159F MED LIST DOCD IN RCRD: CPT | Performed by: INTERNAL MEDICINE

## 2023-08-07 PROCEDURE — 99214 OFFICE O/P EST MOD 30 MIN: CPT | Performed by: INTERNAL MEDICINE

## 2023-08-07 PROCEDURE — 85610 PROTHROMBIN TIME: CPT | Performed by: INTERNAL MEDICINE

## 2023-08-07 PROCEDURE — 36416 COLLJ CAPILLARY BLOOD SPEC: CPT | Performed by: INTERNAL MEDICINE

## 2023-08-07 PROCEDURE — 3074F SYST BP LT 130 MM HG: CPT | Performed by: INTERNAL MEDICINE

## 2023-08-07 PROCEDURE — 1160F RVW MEDS BY RX/DR IN RCRD: CPT | Performed by: INTERNAL MEDICINE

## 2023-08-07 PROCEDURE — 3078F DIAST BP <80 MM HG: CPT | Performed by: INTERNAL MEDICINE

## 2023-08-07 RX ORDER — CYANOCOBALAMIN 1000 UG/ML
1000 INJECTION, SOLUTION INTRAMUSCULAR; SUBCUTANEOUS
Qty: 1 ML | Refills: 5 | Status: SHIPPED | OUTPATIENT
Start: 2023-08-07

## 2023-08-16 ENCOUNTER — ANTICOAGULATION VISIT (OUTPATIENT)
Dept: CARDIOLOGY | Facility: CLINIC | Age: 68
End: 2023-08-16
Payer: MEDICARE

## 2023-08-16 VITALS
WEIGHT: 229 LBS | SYSTOLIC BLOOD PRESSURE: 167 MMHG | DIASTOLIC BLOOD PRESSURE: 85 MMHG | BODY MASS INDEX: 33.82 KG/M2 | HEART RATE: 46 BPM

## 2023-08-16 DIAGNOSIS — Z79.01 LONG TERM (CURRENT) USE OF ANTICOAGULANTS: ICD-10-CM

## 2023-08-16 DIAGNOSIS — I48.11 LONGSTANDING PERSISTENT ATRIAL FIBRILLATION: Primary | ICD-10-CM

## 2023-08-16 LAB — INR PPP: 1.4 (ref 2–3)

## 2023-08-19 DIAGNOSIS — I48.0 PAROXYSMAL ATRIAL FIBRILLATION: ICD-10-CM

## 2023-08-21 RX ORDER — WARFARIN SODIUM 5 MG/1
TABLET ORAL
Qty: 45 TABLET | Refills: 2 | Status: SHIPPED | OUTPATIENT
Start: 2023-08-21

## 2023-08-21 NOTE — TELEPHONE ENCOUNTER
Rx Refill Note  Requested Prescriptions     Pending Prescriptions Disp Refills    warfarin (COUMADIN) 5 MG tablet 45 tablet 2     Sig: Take 1 1/2 tabs, by mouth, daily except 1 tab on Mon and Fri or as directed.   Last INR 8/16/23  Last office visit with prescribing clinician: Visit date not found   Last telemedicine visit with prescribing clinician: Visit date not found   Next office visit with prescribing clinician: 8/19/2023                         Would you like a call back once the refill request has been completed: [] Yes [] No    If the office needs to give you a call back, can they leave a voicemail: [] Yes [] No    Leeanna Javier RN  08/21/23, 09:15 EDT

## 2023-09-01 ENCOUNTER — ANTICOAGULATION VISIT (OUTPATIENT)
Dept: CARDIOLOGY | Facility: CLINIC | Age: 68
End: 2023-09-01
Payer: MEDICARE

## 2023-09-01 ENCOUNTER — TELEPHONE (OUTPATIENT)
Dept: CARDIOLOGY | Facility: CLINIC | Age: 68
End: 2023-09-01

## 2023-09-01 VITALS
DIASTOLIC BLOOD PRESSURE: 90 MMHG | BODY MASS INDEX: 33.67 KG/M2 | HEART RATE: 49 BPM | SYSTOLIC BLOOD PRESSURE: 152 MMHG | WEIGHT: 228 LBS

## 2023-09-01 DIAGNOSIS — I48.11 LONGSTANDING PERSISTENT ATRIAL FIBRILLATION: Primary | ICD-10-CM

## 2023-09-01 DIAGNOSIS — Z79.01 LONG TERM (CURRENT) USE OF ANTICOAGULANTS: ICD-10-CM

## 2023-09-01 DIAGNOSIS — I48.0 PAROXYSMAL ATRIAL FIBRILLATION: ICD-10-CM

## 2023-09-01 LAB — INR PPP: 2 (ref 2–3)

## 2023-09-01 RX ORDER — WARFARIN SODIUM 5 MG/1
TABLET ORAL
Qty: 45 TABLET | Refills: 2 | Status: SHIPPED | OUTPATIENT
Start: 2023-09-01

## 2023-09-01 NOTE — TELEPHONE ENCOUNTER
Pt needs metoprolol, atorvastatin, and lisinopril sent to local pharmacy  [facility] in [place]. Original ordering doctor was hospitalist.

## 2023-09-04 RX ORDER — LISINOPRIL 10 MG/1
10 TABLET ORAL DAILY
Qty: 90 TABLET | Refills: 3 | Status: SHIPPED | OUTPATIENT
Start: 2023-09-04

## 2023-09-04 RX ORDER — ATORVASTATIN CALCIUM 40 MG/1
40 TABLET, FILM COATED ORAL DAILY
Qty: 90 TABLET | Refills: 3 | Status: SHIPPED | OUTPATIENT
Start: 2023-09-04

## 2023-09-04 RX ORDER — METOPROLOL SUCCINATE 50 MG/1
50 TABLET, EXTENDED RELEASE ORAL DAILY
Qty: 90 TABLET | Refills: 3 | Status: SHIPPED | OUTPATIENT
Start: 2023-09-04

## 2023-09-26 ENCOUNTER — ANTICOAGULATION VISIT (OUTPATIENT)
Dept: CARDIOLOGY | Facility: CLINIC | Age: 68
End: 2023-09-26
Payer: MEDICARE

## 2023-09-26 VITALS
HEART RATE: 52 BPM | DIASTOLIC BLOOD PRESSURE: 94 MMHG | WEIGHT: 229 LBS | BODY MASS INDEX: 33.82 KG/M2 | SYSTOLIC BLOOD PRESSURE: 164 MMHG

## 2023-09-26 DIAGNOSIS — Z79.01 LONG TERM (CURRENT) USE OF ANTICOAGULANTS: ICD-10-CM

## 2023-09-26 DIAGNOSIS — I48.11 LONGSTANDING PERSISTENT ATRIAL FIBRILLATION: Primary | ICD-10-CM

## 2023-09-26 LAB — INR PPP: 2 (ref 0.9–1.1)

## 2023-10-26 ENCOUNTER — TELEPHONE (OUTPATIENT)
Dept: CARDIOLOGY | Facility: CLINIC | Age: 68
End: 2023-10-26
Payer: MEDICARE

## 2023-10-26 ENCOUNTER — ANTICOAGULATION VISIT (OUTPATIENT)
Dept: CARDIOLOGY | Facility: CLINIC | Age: 68
End: 2023-10-26
Payer: MEDICARE

## 2023-10-26 ENCOUNTER — PATIENT MESSAGE (OUTPATIENT)
Dept: CARDIOLOGY | Facility: CLINIC | Age: 68
End: 2023-10-26
Payer: MEDICARE

## 2023-10-26 DIAGNOSIS — Z79.01 LONG TERM (CURRENT) USE OF ANTICOAGULANTS: ICD-10-CM

## 2023-10-26 DIAGNOSIS — I48.11 LONGSTANDING PERSISTENT ATRIAL FIBRILLATION: Primary | ICD-10-CM

## 2023-10-26 LAB
INR PPP: 2.6
INR PPP: 2.6

## 2023-10-26 NOTE — TELEPHONE ENCOUNTER
From: Juarez Christina  To: Denton Kirkland  Sent: 10/26/2023 11:22 AM EDT  Subject: INR Testing    Good Morning… I am scheduled for testing this coming Monday, however, I received the home testing set-up today. I was able to do my own test with a result of 2.6. Unless you advise otherwise, I will cancel my Monday appointment. Levon Christina

## 2023-10-26 NOTE — TELEPHONE ENCOUNTER
Called advised pt INR is WNL received mychart message documented under anticoag encounter. Upcoming in office INR appt cancelled

## 2023-10-26 NOTE — TELEPHONE ENCOUNTER
Regarding: INR Testing  Contact: [phone]  ----- Message from Waleska Kohler MA sent at 10/26/2023 11:27 AM EDT -----       ----- Message from Juarez Christina to Denton Kirkland MD sent at 10/26/2023 11:22 AM -----   Good Morning… I am scheduled for testing this coming Monday, however, I received the home testing set-up today. I was able to do my own test with a result of  2.6.  Unless you advise otherwise, I will cancel my Monday appointment.  Levon Christina

## 2023-10-27 ENCOUNTER — ANTICOAGULATION VISIT (OUTPATIENT)
Dept: CARDIOLOGY | Facility: CLINIC | Age: 68
End: 2023-10-27
Payer: MEDICARE

## 2023-10-27 DIAGNOSIS — I48.11 LONGSTANDING PERSISTENT ATRIAL FIBRILLATION: Primary | ICD-10-CM

## 2023-10-27 DIAGNOSIS — Z79.01 LONG TERM (CURRENT) USE OF ANTICOAGULANTS: ICD-10-CM

## 2023-11-07 LAB — INR PPP: 1.4

## 2023-11-08 ENCOUNTER — TELEPHONE (OUTPATIENT)
Dept: CARDIOLOGY | Facility: CLINIC | Age: 68
End: 2023-11-08
Payer: MEDICARE

## 2023-11-08 ENCOUNTER — ANTICOAGULATION VISIT (OUTPATIENT)
Dept: CARDIOLOGY | Facility: CLINIC | Age: 68
End: 2023-11-08
Payer: MEDICARE

## 2023-11-08 DIAGNOSIS — Z79.01 LONG TERM (CURRENT) USE OF ANTICOAGULANTS: ICD-10-CM

## 2023-11-08 DIAGNOSIS — I48.11 LONGSTANDING PERSISTENT ATRIAL FIBRILLATION: Primary | ICD-10-CM

## 2023-11-08 NOTE — PROGRESS NOTES
Pts INR was low at 1.4. Pt does not report any changes in meds or diet. Will increase dosage to 7.5 mgs daily and recheck in a week. [email or web address]

## 2023-11-21 ENCOUNTER — ANTICOAGULATION VISIT (OUTPATIENT)
Dept: CARDIOLOGY | Facility: CLINIC | Age: 68
End: 2023-11-21
Payer: MEDICARE

## 2023-11-21 DIAGNOSIS — I48.11 LONGSTANDING PERSISTENT ATRIAL FIBRILLATION: Primary | ICD-10-CM

## 2023-11-21 DIAGNOSIS — Z79.01 LONG TERM (CURRENT) USE OF ANTICOAGULANTS: ICD-10-CM

## 2023-11-21 LAB — INR PPP: 2.7 (ref 0.9–1.1)

## 2023-11-22 NOTE — PROGRESS NOTES
Subjective:  Stroke    Patient ID: Juarez Christina is a 68 y.o. male.    CHIEF COMPLAINT: Stroke    History of Present Illness Mr. Christina is a very pleasant 68-year-old  male who presented alone for a follow-up post stroke referred by [facility].  The patient states since he had the stroke he has been doing very well.  He reports that he could not afford the blood thinners that were suggested and has been placed on warfarin.  He states he is doing well with that and his cardiologist is currently monitoring his levels.  The patient was found to have atrial fibrillation which most likely was the cause of his stroke.    Currently taking Aspirin, Warfarin, and Atorvastatin.    He has a past medical history of hypertension,Atrial fibrillation, hyperlipidemia, non-Hodgkin's lymphoma status post bone marrow transplant, fatty liver and macular degeneration.  He presented to [facility] on 5/26/2023 complaining of aphasia.  He was admitted on 5/26/2023 and discharged on 5/28/2023.     The patient [place] [employer] when he had acute onset of aphasia.  His wife had reported the episode lasted about 30 seconds.  On the way to the hospital wife reported the patient had 2 more episodes where he was nonsensical in speech.  When he got to the ED he had more episodes where he was experiencing more expressive aphasia.      Testing:  Chest x-ray showed bibasilar opacities that may represent atelectasis or infiltrate, and cardiomegaly.    CT head and neck showed no significant stenosis.  Ascending aortic root aneurysm measuring 4.2 cm in AP dimension.    CT perfusion without contrast showed no core infarct but findings suspicion for tissue at risk in left parietal lobe recommend MRI.  MRI Juan: Impression:  1.Small subacute infarct within left insula, and tiny subacute infarcts within left caudate head and left high frontal lobe. Petechial staining within left caudate infarct, but no  fadi hemorrhagic transformation.  2.Findings suggestive of minimal chronic small vessel ischemic disease.  Electronically Signed: Juventino Bell    5/27/2023 4:53 PM EDT    Workstation ID:   Echo Complete W/ Cont if Necessary Per Protocol  Interpretation Summary    Left ventricular systolic function is normal. Calculated left ventricular EF = 68% Left ventricular ejection fraction appears to be 66 - 70%.    Left ventricular diastolic dysfunction is noted.    The right ventricular cavity is dilated.    The left atrial cavity is dilated.    Saline test results are negative for right to left atrial level shunt.    Moderate aortic valve stenosis is present.    Estimated right ventricular systolic pressure from tricuspid regurgitation is mildly elevated (35-45 mmHg).    Mild pulmonary hypertension is present.    ECG: The patient had a first-degree AV block that was new since 2018.  The patient had a PAC and PVC noted.  Comparison EKG showed old inferior changes and sinus rhythm with occasional PAC.     His statin dose has been increased from 10 mg to 40 mg daily.    Thyroid studies: Slightly low free T4.    LDL was 58.    Total cholesterol 147  triglycerides were 317.    B12 levels were  low 281.    A1c was 6.2  Previously diagnosed with sleep apnea however he turned in machine.             IMAGING STUDIES:  CT Angiogram Neck  Result Date: 5/26/2023  1.No hemodynamically significant stenosis, large vessel cut or aneurysms in intracranial circulation 2.No hemodynamically significant stenosis, dissection or aneurysms in extracranial circulation. 3.Ascending aortic root aneurysm measuring 4.2 cm in AP dimension. Electronically Signed: Rinku Sexton  5/26/2023 9:45 PM EDT  Workstation ID: [id number]        CT Head Without Contrast Stroke Protocol   Result Date: 5/26/2023  1.No acute intracranial hemorrhage. Calvarium is intact. Electronically Signed: Rinku Sexton  5/26/2023 9:22 PM EDT  Workstation ID: [id number]     CT  Angiogram Head w AI Analysis of LVO  Result Date: 5/26/2023  1.No hemodynamically significant stenosis, large vessel cut or aneurysms in intracranial circulation 2.No hemodynamically significant stenosis, dissection or aneurysms in extracranial circulation. 3.Ascending aortic root aneurysm measuring 4.2 cm in AP dimension. Electronically Signed: Rinku Sexton  5/26/2023 9:45 PM EDT  Workstation ID: [id number]     CT CEREBRAL PERFUSION WITH & WITHOUT CONTRAST  Result Date: 5/26/2023  Impression: 1.No core infarct is identified. 2.Findings raising suspicion for tissue at risk in left parietal lobe. MRI can be obtained for further evaluation. Case discussed with Dr. TONY BURGER @ 5/26/2023 10:01 PM EDT. Electronically Signed: Rinku Sexton  5/26/2023 10:01 PM EDT  Workstation ID: [id number]      MRI IMPRESSION:  Impression:  1.Small subacute infarct within left insula, and tiny subacute infarcts within left caudate head and left high frontal lobe. Petechial staining within left caudate infarct, but no fadi hemorrhagic transformation.  2.Findings suggestive of minimal chronic small vessel ischemic disease.  Electronically Signed: Juventino Bell    5/27/2023 4:53 PM EDT    Workstation ID: [id number]          The following portions of the patient's history were reviewed and updated as appropriate: allergies, current medications, past family history, past medical history, past social history, past surgical history and problem list.      Family History   Problem Relation Age of Onset    Heart disease Mother     Hypertension Mother     Heart disease Father     Hypertension Father     Heart attack Maternal Grandfather     Asthma Brother     Heart disease Brother     Hypertension Brother     Heart disease Brother     Hypertension Brother     Hypertension Brother        Past Medical History:   Diagnosis Date    Abnormal ECG 2021    Acute nauso-[id number] disease     Acute ischemic left MCA stroke 05/28/2023    Arrhythmia 2021     Atrial fibrillation 7/28/2023    Cancer     Cerebrovascular small vessel disease 05/28/2023    Essential hypertension 05/27/2023    Expressive aphasia 05/27/2023    Fatty liver 05/27/2023    H/O acute respiratory failure     Hepatitis     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    History of kidney stones 05/27/2023    History of non-Hodgkin's lymphoma 05/27/2023    Hypertension     Immune deficiency disorder     Kidney stone     Macular degeneration     Mild pulmonary hypertension 05/27/2023    Mixed hyperlipidemia 05/27/2023    Moderate aortic valve stenosis 05/27/2023    Multi-organ failure with heart failure     history    Non Hodgkin's lymphoma     Obesity (BMI 30-39.9) 05/27/2023    Prediabetes 05/28/2023    Radiation necrosis of skin and subcutaneous     Sleep apnea 2003       Social History     Socioeconomic History    Marital status:    Tobacco Use    Smoking status: Never    Smokeless tobacco: Never   Vaping Use    Vaping Use: Never used   Substance and Sexual Activity    Alcohol use: Yes     Alcohol/week: 10.0 standard drinks of alcohol     Types: 10 Cans of beer per week     Comment: daily    Drug use: No    Sexual activity: Yes     Partners: Female         Current Outpatient Medications:     aspirin 81 MG chewable tablet, Chew 1 tablet Daily., Disp: , Rfl:     atorvastatin (LIPITOR) 40 MG tablet, Take 1 tablet by mouth Daily., Disp: 90 tablet, Rfl: 3    lisinopril (PRINIVIL,ZESTRIL) 10 MG tablet, Take 1 tablet by mouth Daily., Disp: 90 tablet, Rfl: 3    metoprolol succinate XL (TOPROL-XL) 50 MG 24 hr tablet, Take 1 tablet by mouth Daily., Disp: 90 tablet, Rfl: 3    multivitamin with minerals tablet tablet, Take 1 tablet by mouth Daily., Disp: , Rfl:     warfarin (COUMADIN) 5 MG tablet, Take one and one half tablets by mouth daily or as directed, Disp: 135 tablet, Rfl: 0    Review of Systems     I have reviewed ROS completed by medical  assistant.     Objective:    Neurologic Exam     Mental Status   Oriented to person, place, and time.     Cranial Nerves     CN III, IV, VI   Pupils are equal, round, and reactive to light.    Gait, Coordination, and Reflexes     Gait  Gait: normal      Physical Exam  Vitals reviewed.   Constitutional:       Appearance: Normal appearance. He is well-groomed. He is obese.   HENT:      Head: Normocephalic and atraumatic.        Right Ear: Hearing normal.      Left Ear: Hearing normal.      Nose: Nose normal.      Mouth/Throat:      Lips: Pink.      Mouth: Mucous membranes are moist.   Eyes:      General: Lids are normal. No visual field deficit.     Extraocular Movements: Extraocular movements intact.      Pupils: Pupils are equal, round, and reactive to light.   Cardiovascular:      Rate and Rhythm: Normal rate.   Pulmonary:      Effort: Pulmonary effort is normal.      Breath sounds: Normal air entry.   Musculoskeletal:         General: Normal range of motion.      Cervical back: Full passive range of motion without pain and normal range of motion.   Skin:     General: Skin is warm and dry.   Neurological:      Mental Status: He is alert and oriented to person, place, and time.      Cranial Nerves: Facial asymmetry (Left eye lid ptosis left facial droop increased left nasolabial fold the patient states that this has been present since Hodgkin's lymphoma) present. No dysarthria.      Motor: No weakness or tremor.      Gait: Gait is intact. Gait normal.   Psychiatric:         Attention and Perception: Attention and perception normal.         Mood and Affect: Mood and affect normal.         Behavior: Behavior is cooperative.       Assessment/Plan:  Discussion: The patient was encouraged to control his blood pressure, he was elevated today in the clinic at 143/91.  He was encouraged to watch his blood sugars, watch his LDL cholesterol, start a B12 supplement, work on weight loss, continue Coumadin (due to a-fib)and  "statin and healthy heart diet.  He was also told to follow-up with cardiology and vascular about his aneurysm of the aortic root.  The patient does have a history of sleep apnea and did not continue with CPAP.  The patient's BMI is 34.11, he has just had a stroke and his cardiac disease, the patient was encouraged to undergo home sleep test and treat the sleep apnea to decrease stroke and cardiac risk.  The patient states he will contact the clinic later. He is to start B-12 supplementation.     Knee Surgery: Patient states that he is to have knee surgery soon within the next 2 months.  He was notified after stroke that it is suggested that you do not have any elective surgeries for at least 1 year.  He was also notified that anytime he stops his blood thinners that it could increase risk for another stroke.  The patient does have a history of atrial fibrillation which markedly increases this risk.  The patient states he will discuss with his orthopedic surgeon, primary care and cardiology.    The patient was educated on the Stroke Acronym \"BE FAST\" B=Balance issues, E=Vision changes, F=Face weakness or numbness, A=Arm or Leg weakness or numbness, S=Speech problems and T=Time to call, 911 he voiced understanding.  He was also given a pamphlet outlining [facility]    Diagnoses and all orders for this visit:    1. H/O: stroke (Primary)  Comments:  BP<130/90, A1c<6.5, LDL<70, B12>500, control weight, exercise 20 min TIW, continue Coumadin, continue statin, healthy heart diet.    2. Aneurysm of aortic root  Comments:  follow up with Cardiology and Vascular    3. Obstructive sleep apnea  Comments:  Pt to call clinic after discussing with Cardio and Wife RN.  Encouraged to have HST and treat THANH to decrease stroke and cardiac risks.  Not currently treated    4. B12 deficiency  Comments:  Pt was notified that he needs a b-12 supplement to decrease a stroke risk        Return if symptoms worsen or fail to improve.  I spent 40 " minutes caring for Juarez on this date of service. This time includes time spent by me in the following activities: reviewing tests, obtaining and/or reviewing a separately obtained history, performing a medically appropriate examination and/or evaluation, counseling and educating the patient/family/caregiver, referring and communicating with other health care professionals, documenting information in the medical record, and independently interpreting results and communicating that information with the patient/family/caregiver.      This document has been electronically signed by Stephania ROMAN on November 28, 2023 09:07 EST

## 2023-11-28 ENCOUNTER — TELEPHONE (OUTPATIENT)
Dept: CARDIOLOGY | Facility: CLINIC | Age: 68
End: 2023-11-28
Payer: MEDICARE

## 2023-11-28 ENCOUNTER — OFFICE VISIT (OUTPATIENT)
Dept: NEUROLOGY | Facility: CLINIC | Age: 68
End: 2023-11-28
Payer: MEDICARE

## 2023-11-28 VITALS
BODY MASS INDEX: 34.21 KG/M2 | WEIGHT: 231 LBS | DIASTOLIC BLOOD PRESSURE: 91 MMHG | HEART RATE: 59 BPM | SYSTOLIC BLOOD PRESSURE: 143 MMHG | HEIGHT: 69 IN

## 2023-11-28 DIAGNOSIS — I71.21 ANEURYSM OF AORTIC ROOT: Chronic | ICD-10-CM

## 2023-11-28 DIAGNOSIS — E53.8 B12 DEFICIENCY: Chronic | ICD-10-CM

## 2023-11-28 DIAGNOSIS — G47.33 OBSTRUCTIVE SLEEP APNEA: Chronic | ICD-10-CM

## 2023-11-28 DIAGNOSIS — I48.0 PAROXYSMAL ATRIAL FIBRILLATION: ICD-10-CM

## 2023-11-28 DIAGNOSIS — Z86.73 H/O: STROKE: Primary | ICD-10-CM

## 2023-11-28 RX ORDER — WARFARIN SODIUM 5 MG/1
TABLET ORAL
Qty: 135 TABLET | Refills: 0 | Status: SHIPPED | OUTPATIENT
Start: 2023-11-28

## 2023-11-28 RX ORDER — WARFARIN SODIUM 5 MG/1
TABLET ORAL
Qty: 45 TABLET | OUTPATIENT
Start: 2023-11-28

## 2023-11-28 NOTE — TELEPHONE ENCOUNTER
Pt seen by neurologist today. There was discussion about his aortic aneurysm. Pt wife thinks this was discussed previously but neurologist wanted pt to make sure we were aware and that it wasn't overlooked. It is documented in last office visit.     Please advise If any further workup needs to be done at this time.       Thanks

## 2023-11-28 NOTE — TELEPHONE ENCOUNTER
Rx Refill Note  Requested Prescriptions     Pending Prescriptions Disp Refills    warfarin (COUMADIN) 5 MG tablet 135 tablet 0     Sig: Take one and one half tablets by mouth daily or as directed      Last office visit with prescribing clinician: 8/7/2023   Last telemedicine visit with prescribing clinician: Visit date not found   Next office visit with prescribing clinician: 1/31/2024     Anticoagulation Visit with Denton Kirkland MD (11/21/2023)     Mellisa Brooks LPN  11/28/23, 08:58 EST

## 2023-11-28 NOTE — TELEPHONE ENCOUNTER
Duplicate Refill request. Refill sent after pt mychart message at 9 this morning confirmed it was received by the pharmacy

## 2023-12-15 ENCOUNTER — ANTICOAGULATION VISIT (OUTPATIENT)
Dept: CARDIOLOGY | Facility: CLINIC | Age: 68
End: 2023-12-15
Payer: MEDICARE

## 2023-12-15 DIAGNOSIS — Z79.01 LONG TERM (CURRENT) USE OF ANTICOAGULANTS: ICD-10-CM

## 2023-12-15 DIAGNOSIS — I48.11 LONGSTANDING PERSISTENT ATRIAL FIBRILLATION: Primary | ICD-10-CM

## 2023-12-15 LAB — INR PPP: 2.5

## 2023-12-27 ENCOUNTER — ANTICOAGULATION VISIT (OUTPATIENT)
Dept: CARDIOLOGY | Facility: CLINIC | Age: 68
End: 2023-12-27
Payer: MEDICARE

## 2023-12-27 DIAGNOSIS — I48.11 LONGSTANDING PERSISTENT ATRIAL FIBRILLATION: Primary | ICD-10-CM

## 2023-12-27 DIAGNOSIS — Z79.01 LONG TERM (CURRENT) USE OF ANTICOAGULANTS: ICD-10-CM

## 2023-12-27 LAB — INR PPP: 1.9

## 2024-01-08 ENCOUNTER — TELEPHONE (OUTPATIENT)
Dept: CARDIOLOGY | Facility: CLINIC | Age: 69
End: 2024-01-08
Payer: MEDICARE

## 2024-01-08 NOTE — TELEPHONE ENCOUNTER
Caller: Juarez Christina    Relationship to patient: Self    Best call back number: [phone]    Patient is needing: PT REQ A CALL BACK FROM JUNE. WOULD NOT GO INTO DETAILS OF WHAT HE NEEDED.

## 2024-01-09 ENCOUNTER — ANTICOAGULATION VISIT (OUTPATIENT)
Dept: CARDIOLOGY | Facility: CLINIC | Age: 69
End: 2024-01-09
Payer: MEDICARE

## 2024-01-09 DIAGNOSIS — Z79.01 LONG TERM (CURRENT) USE OF ANTICOAGULANTS: ICD-10-CM

## 2024-01-09 DIAGNOSIS — I48.11 LONGSTANDING PERSISTENT ATRIAL FIBRILLATION: Primary | ICD-10-CM

## 2024-01-09 LAB — INR PPP: 1.9

## 2024-01-12 ENCOUNTER — OFFICE VISIT (OUTPATIENT)
Dept: CARDIOLOGY | Facility: CLINIC | Age: 69
End: 2024-01-12
Payer: MEDICARE

## 2024-01-12 VITALS
OXYGEN SATURATION: 96 % | BODY MASS INDEX: 34.9 KG/M2 | HEIGHT: 69 IN | SYSTOLIC BLOOD PRESSURE: 137 MMHG | DIASTOLIC BLOOD PRESSURE: 75 MMHG | WEIGHT: 235.6 LBS | HEART RATE: 62 BPM

## 2024-01-12 DIAGNOSIS — I71.21 AORTIC ROOT ANEURYSM: ICD-10-CM

## 2024-01-12 DIAGNOSIS — G45.9 TRANSIENT ISCHEMIC ATTACK: ICD-10-CM

## 2024-01-12 DIAGNOSIS — I63.512 ACUTE ISCHEMIC LEFT MCA STROKE: ICD-10-CM

## 2024-01-12 DIAGNOSIS — I10 ESSENTIAL HYPERTENSION: ICD-10-CM

## 2024-01-12 DIAGNOSIS — I48.0 PAROXYSMAL ATRIAL FIBRILLATION: Primary | ICD-10-CM

## 2024-01-12 DIAGNOSIS — R73.03 PREDIABETES: ICD-10-CM

## 2024-01-12 DIAGNOSIS — I48.11 LONGSTANDING PERSISTENT ATRIAL FIBRILLATION: ICD-10-CM

## 2024-01-12 DIAGNOSIS — Z85.72 HISTORY OF NON-HODGKIN'S LYMPHOMA: ICD-10-CM

## 2024-01-12 DIAGNOSIS — Z79.01 LONG TERM (CURRENT) USE OF ANTICOAGULANTS: ICD-10-CM

## 2024-01-12 DIAGNOSIS — I35.0 MODERATE AORTIC VALVE STENOSIS: ICD-10-CM

## 2024-01-12 DIAGNOSIS — E53.8 B12 DEFICIENCY: ICD-10-CM

## 2024-01-12 DIAGNOSIS — E78.2 MIXED HYPERLIPIDEMIA: ICD-10-CM

## 2024-01-12 DIAGNOSIS — K76.0 FATTY LIVER: ICD-10-CM

## 2024-01-12 DIAGNOSIS — E66.9 OBESITY (BMI 30-39.9): ICD-10-CM

## 2024-01-12 NOTE — PROGRESS NOTES
Encounter Date:01/12/2024        Patient ID: Juarez Christina is a 68 y.o. male.      Chief Complaint:      History of Present Illness  68 years old man with hypertension, hyperlipidemia, aortic aneurysm, diabetes who was recently in the hospital with strokelike symptoms MRI showed small subacute infarct within left insula and tiny subacute infarct within the left caudate head and left high frontal lobe.  He was started on dual antiplatelet therapy, high intensity statin.  An echocardiogram suggested normal LV function with at least moderate aortic stenosis.  He was recommended to undergo an outpatient transesophageal echocardiogram to further evaluate his aortic valve and to also look for cardioembolic source for stroke.  MCOT was also provided to him to look for atrial fibrillation since he has risk factors for arrhythmia.  Today he returns feeling well.  He has resumed all activities and currently denies any chest pain or shortness of breath.  He is contemplating having left knee replacement procedure done in March.  He is seeking preoperative cardiovascular risk assessment    The following portions of the patient's history were reviewed and updated as appropriate: allergies, current medications, past family history, past medical history, past social history, past surgical history, and problem list.    Review of Systems   Constitutional: Negative for malaise/fatigue.   Cardiovascular:  Negative for chest pain, dyspnea on exertion, leg swelling and palpitations.   Respiratory:  Negative for cough and shortness of breath.    Gastrointestinal:  Negative for abdominal pain, nausea and vomiting.   Neurological:  Positive for numbness. Negative for dizziness, focal weakness, headaches and light-headedness.   All other systems reviewed and are negative.      Current Outpatient Medications:     aspirin 81 MG chewable tablet, Chew 1 tablet Daily., Disp: , Rfl:     atorvastatin (LIPITOR) 40 MG tablet, Take 1 tablet by  mouth Daily., Disp: 90 tablet, Rfl: 3    lisinopril (PRINIVIL,ZESTRIL) 10 MG tablet, Take 1 tablet by mouth Daily., Disp: 90 tablet, Rfl: 3    metoprolol succinate XL (TOPROL-XL) 50 MG 24 hr tablet, Take 1 tablet by mouth Daily., Disp: 90 tablet, Rfl: 3    multivitamin with minerals tablet tablet, Take 1 tablet by mouth Daily., Disp: , Rfl:     warfarin (COUMADIN) 5 MG tablet, Take one and one half tablets by mouth daily or as directed, Disp: 135 tablet, Rfl: 0    Current outpatient and discharge medications have been reconciled for the patient.  Reviewed by: Denton Kirkland MD       No Known Allergies    Family History   Problem Relation Age of Onset    Heart disease Mother     Hypertension Mother     Heart disease Father     Hypertension Father     Heart attack Maternal Grandfather     Asthma Brother     Heart disease Brother     Hypertension Brother     Heart disease Brother     Hypertension Brother     Hypertension Brother        Past Surgical History:   Procedure Laterality Date    BONE MARROW TRANSPLANT      CARDIAC CATHETERIZATION  1989    CHOLECYSTECTOMY      KNEE ASPIRATION      SMALL INTESTINE SURGERY      TOTAL SHOULDER REPLACEMENT         Past Medical History:   Diagnosis Date    Abnormal ECG 2021    Acute ouixx-xldzxm-aohf disease     Acute ischemic left MCA stroke 05/28/2023    Arrhythmia 2021    Atrial fibrillation 7/28/2023    Cancer     Cerebrovascular small vessel disease 05/28/2023    Essential hypertension 05/27/2023    Expressive aphasia 05/27/2023    Fatty liver 05/27/2023    H/O acute respiratory failure     Hepatitis     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    History of kidney stones 05/27/2023    History of non-Hodgkin's lymphoma 05/27/2023    Hypertension     Immune deficiency disorder     Kidney stone     Macular degeneration     Mild pulmonary hypertension 05/27/2023    Mixed hyperlipidemia 05/27/2023    Moderate aortic valve  stenosis 05/27/2023    Multi-organ failure with heart failure     history    Non Hodgkin's lymphoma     Obesity (BMI 30-39.9) 05/27/2023    Prediabetes 05/28/2023    Radiation necrosis of skin and subcutaneous     Sleep apnea 2003       Family History   Problem Relation Age of Onset    Heart disease Mother     Hypertension Mother     Heart disease Father     Hypertension Father     Heart attack Maternal Grandfather     Asthma Brother     Heart disease Brother     Hypertension Brother     Heart disease Brother     Hypertension Brother     Hypertension Brother        Social History     Socioeconomic History    Marital status:    Tobacco Use    Smoking status: Never    Smokeless tobacco: Never   Vaping Use    Vaping Use: Never used   Substance and Sexual Activity    Alcohol use: Yes     Alcohol/week: 10.0 standard drinks of alcohol     Types: 10 Cans of beer per week     Comment: daily    Drug use: No    Sexual activity: Yes     Partners: Female               Objective:       Physical Exam    There were no vitals taken for this visit.  The patient is alert, oriented and in no distress.    Vital signs as noted above.    Head and neck revealed no carotid bruits or jugular venous distension.  No thyromegaly or lymphadenopathy is present.    Lungs clear.  No wheezing.  Breath sounds are normal bilaterally.    Heart normal first and second heart sounds.  No murmur..  No pericardial rub is present.  No gallop is present.    Abdomen soft and nontender.  No organomegaly is present.    Extremities revealed good peripheral pulses without any pedal edema.    Skin warm and dry.    Musculoskeletal system is grossly normal.    CNS grossly normal.          No diagnosis found.LAB RESULTS (LAST 7 DAYS)    CBC        BMP        CMP         BNP        TROPONIN        CoAg  Results from last 7 days   Lab Units 01/09/24  0000   INR  1.90       Creatinine Clearance  CrCl cannot be calculated (Patient's most recent lab result is older  than the maximum 30 days allowed.).    ABG        Radiology  No radiology results for the last day    EKG  Procedures    Stress test      Echocardiogram  Results for orders placed during the hospital encounter of 06/26/23    Adult Transesophageal Echo (HANNA) W/ Cont if Necessary Per Protocol    Interpretation Summary    Left ventricular systolic function is low normal. Left ventricular ejection fraction appears to be 51 - 55%.    The left atrial cavity is moderately dilated.    Saline test results are negative.    Mild aortic valve stenosis is present.    Moderate mitral valve regurgitation is present.      Cardiac catheterization  No results found for this or any previous visit.          Assessment and Plan       There are no diagnoses linked to this encounter.     Stroke  CT head with no acute intracranial abnormalities.  CTA with no hemodynamically significant stenosis.  MRI shows small subacute infarct within left insula and tiny subacute infarct within the left caudate head and left high frontal lobe.  HANNA showed mild aortic stenosis and moderate mitral regurgitation with enlarged left atrium.  MCOT confirmed atrial fibrillation which could have contributed to stroke.     Atrial fibrillation (paroxysmal)  Paroxysmal atrial fibrillation diagnosed with MCOT  Patient was monitored for 27 days 15 hours and 29 minutes. Predominant rhythm was sinus. Average heart rate 68 bpm, lowest 50 and highest 151 bpm. Atrial fibrillation noted with a total burden of 3%. First-degree AV block at 55 bpm. No SVT or pauses. 14% PACs and 1% PVCs. 8 beat run of VT at 151 bpm. Clinical correlation is required. Consider anticoagulation for stroke prevention in the setting of atrial fibrillation.   [id number] score is 5  Continue warfarin with goal INR of 2-3  Continue metoprolol for rate control  Dose has been reduced due to persistent bradycardia.     Aortic stenosis and ascending aortic aneurysm  Echocardiogram shows EF of 68%,  diastolic dysfunction.  Bubble study is negative however he has calcified and restricted opening of aortic valve consistent with aortic stenosis.  CTA also showed ascending aortic aneurysm 4.2 cm.  HANNA showed mild aortic stenosis and moderate mitral regurgitation     Hypertension  Blood pressure is well-controlled.  Continue ACE inhibitor and beta-blocker  Needs blood pressure and heart rate control due to findings of aortic aneurysm.  Bradycardia is new for him and MCOT showed a lowest heart rate of 50 bpm  Metoprolol dose has been reduced     Hyperlipidemia  LDL 58, HDL 40, total cholesterol 147.  Continue high intensity statin  LDL goal is less than 70.     Diabetes  A1c 6.2  He should be started on metformin.  We will discuss starting Jardiance.     Obesity  Diet, exercise, weight loss and lifestyle modification discussed with the patient.  Screening and treatment of sleep apnea.  Obesity is contributing to HFpEF.     Preoperative cardiovascular risk assessment  He may proceed with left knee replacement with no further cardiac workup  I have instructed him to stop warfarin 5 days prior to surgery.  I have discussed high risk of DVT postprocedure  Warfarin should be resumed as soon as possible after knee replacement surgery

## 2024-01-24 ENCOUNTER — ANTICOAGULATION VISIT (OUTPATIENT)
Dept: CARDIOLOGY | Facility: CLINIC | Age: 69
End: 2024-01-24
Payer: MEDICARE

## 2024-01-24 DIAGNOSIS — I48.11 LONGSTANDING PERSISTENT ATRIAL FIBRILLATION: Primary | ICD-10-CM

## 2024-01-24 DIAGNOSIS — Z79.01 LONG TERM (CURRENT) USE OF ANTICOAGULANTS: ICD-10-CM

## 2024-01-24 LAB — INR PPP: 3.6

## 2024-02-07 ENCOUNTER — ANTICOAGULATION VISIT (OUTPATIENT)
Dept: CARDIOLOGY | Facility: CLINIC | Age: 69
End: 2024-02-07
Payer: MEDICARE

## 2024-02-07 DIAGNOSIS — Z79.01 LONG TERM (CURRENT) USE OF ANTICOAGULANTS: ICD-10-CM

## 2024-02-07 DIAGNOSIS — I48.11 LONGSTANDING PERSISTENT ATRIAL FIBRILLATION: Primary | ICD-10-CM

## 2024-02-07 LAB — INR PPP: 2.5

## 2024-02-20 ENCOUNTER — ANTICOAGULATION VISIT (OUTPATIENT)
Dept: CARDIOLOGY | Facility: CLINIC | Age: 69
End: 2024-02-20
Payer: MEDICARE

## 2024-02-20 DIAGNOSIS — I48.11 LONGSTANDING PERSISTENT ATRIAL FIBRILLATION: Primary | ICD-10-CM

## 2024-02-20 DIAGNOSIS — Z79.01 LONG TERM (CURRENT) USE OF ANTICOAGULANTS: ICD-10-CM

## 2024-02-20 LAB — INR PPP: 3.7

## 2024-03-05 DIAGNOSIS — I48.0 PAROXYSMAL ATRIAL FIBRILLATION: ICD-10-CM

## 2024-03-05 RX ORDER — WARFARIN SODIUM 5 MG/1
TABLET ORAL
Qty: 135 TABLET | Refills: 0 | Status: SHIPPED | OUTPATIENT
Start: 2024-03-05

## 2024-03-05 NOTE — TELEPHONE ENCOUNTER
Rx Refill Note  Requested Prescriptions     Pending Prescriptions Disp Refills    warfarin (COUMADIN) 5 MG tablet [Pharmacy Med Name: WARFARIN SOD 5MG TABLETS (PEACH)] 135 tablet 0     Sig: TAKE 1 AND 1/2 TABLETS BY MOUTH DAILY OR AS DIRECTED      Last office visit with prescribing clinician: 1/12/2024   Last telemedicine visit with prescribing clinician: Visit date not found   Next office visit with prescribing clinician: 7/8/2024   Anticoagulation Visit with Denton Kirkland MD (02/20/2024)     Mellisa Brooks LPN  03/05/24, 12:39 EST

## 2024-03-08 ENCOUNTER — TRANSCRIBE ORDERS (OUTPATIENT)
Dept: HOME HEALTH SERVICES | Facility: HOME HEALTHCARE | Age: 69
End: 2024-03-08
Payer: MEDICARE

## 2024-03-08 ENCOUNTER — HOME HEALTH ADMISSION (OUTPATIENT)
Dept: HOME HEALTH SERVICES | Facility: HOME HEALTHCARE | Age: 69
End: 2024-03-08
Payer: MEDICARE

## 2024-03-08 DIAGNOSIS — Z47.1 AFTERCARE FOLLOWING LEFT KNEE JOINT REPLACEMENT SURGERY: Primary | ICD-10-CM

## 2024-03-08 DIAGNOSIS — Z96.652 AFTERCARE FOLLOWING LEFT KNEE JOINT REPLACEMENT SURGERY: Primary | ICD-10-CM

## 2024-03-09 ENCOUNTER — HOME CARE VISIT (OUTPATIENT)
Dept: HOME HEALTH SERVICES | Facility: HOME HEALTHCARE | Age: 69
End: 2024-03-09
Payer: MEDICARE

## 2024-03-09 VITALS
SYSTOLIC BLOOD PRESSURE: 130 MMHG | TEMPERATURE: 98 F | DIASTOLIC BLOOD PRESSURE: 78 MMHG | HEART RATE: 87 BPM | OXYGEN SATURATION: 97 %

## 2024-03-09 PROCEDURE — G0151 HHCP-SERV OF PT,EA 15 MIN: HCPCS

## 2024-03-09 NOTE — HOME HEALTH
"Reason for Hosp/Primary Dx/Co-morbidities: L total knee replacement    Focus of Care: L total knee replacement    Skilled Need: Skilled PT needed to improve patient's functional mobility, improve safety for transfers and ambulation and return patient to PLOF.        Current Functional status/mobility/DME: Upon eval patient requires Min A for transfers, Min A for gait with a RW exhibiting an antalgic gait pattern. Limited active flexion and extension with the left knee  HB status/Living Arrangements: Due to dec act tolerance, weakness, decline in transfers and ambulation. Patient requires a taxing effort to leave home, putting patient at risk for falls or injury. Patient lives with spouse, uses a RW for safety. 15 steps to get to the basement, steps to get in and out of the home      Skin Integrity/wound status: dressing intact which is to be removed no later than 3.14.24      Code Status: Full Code    Fall Risk: High    POC confirmed with Dr. Danny Marc on 3.9.24    Educated on Emergency Plan, steps to take prior to going to the ER and when to Call Home Health [facility]:  yes     Medication issues/Concerns: none    Additional Problems/Concerns: none    Plan for next visit: HEP teaching, gait training    Patient goal: \"to be able to make use of my leg.\"    Home Health disciplines ordered:   PT SOC + 3w2"

## 2024-03-11 ENCOUNTER — HOME CARE VISIT (OUTPATIENT)
Dept: HOME HEALTH SERVICES | Facility: HOME HEALTHCARE | Age: 69
End: 2024-03-11
Payer: MEDICARE

## 2024-03-11 PROCEDURE — G0151 HHCP-SERV OF PT,EA 15 MIN: HCPCS

## 2024-03-12 VITALS
HEART RATE: 77 BPM | SYSTOLIC BLOOD PRESSURE: 128 MMHG | OXYGEN SATURATION: 94 % | DIASTOLIC BLOOD PRESSURE: 82 MMHG | TEMPERATURE: 98.2 F | RESPIRATION RATE: 18 BRPM

## 2024-03-12 NOTE — HOME HEALTH
Routine Visit Note:    Skill/education provided: ROM exs to lt knee, strengthening exs to lt le, transfer teaching and gait training    Patient/caregiver response: Good improvement in lt knee range of motion and ongoing progress in transfer and gait skills    Plan for next visit: ROM exs to lt knee, strengthening exs to lt le, transfer teaching and gait training

## 2024-03-13 ENCOUNTER — HOME CARE VISIT (OUTPATIENT)
Dept: HOME HEALTH SERVICES | Facility: HOME HEALTHCARE | Age: 69
End: 2024-03-13
Payer: MEDICARE

## 2024-03-13 PROCEDURE — G0157 HHC PT ASSISTANT EA 15: HCPCS

## 2024-03-14 ENCOUNTER — ANTICOAGULATION VISIT (OUTPATIENT)
Dept: CARDIOLOGY | Facility: CLINIC | Age: 69
End: 2024-03-14
Payer: MEDICARE

## 2024-03-14 VITALS
TEMPERATURE: 97.6 F | DIASTOLIC BLOOD PRESSURE: 78 MMHG | OXYGEN SATURATION: 98 % | HEART RATE: 99 BPM | SYSTOLIC BLOOD PRESSURE: 130 MMHG

## 2024-03-14 DIAGNOSIS — I48.11 LONGSTANDING PERSISTENT ATRIAL FIBRILLATION: Primary | ICD-10-CM

## 2024-03-14 DIAGNOSIS — Z79.01 LONG TERM (CURRENT) USE OF ANTICOAGULANTS: ICD-10-CM

## 2024-03-14 LAB — INR PPP: 1.9

## 2024-03-14 NOTE — HOME HEALTH
Routine Visit Note:   Patient was in the living room and said come in.   Patient rated his pain as 6/10.    Skill/education provided: Patient was instructed in therapeutic exercises, safe transfers and ambulation with an appropriate assist device.    Patient/caregiver response: Patient/caregiver understood instructions and performed exercises well.    Plan for next visit:  Advance gait and strengthening exercise as patient pain and tolerance allows.

## 2024-03-15 ENCOUNTER — HOME CARE VISIT (OUTPATIENT)
Dept: HOME HEALTH SERVICES | Facility: HOME HEALTHCARE | Age: 69
End: 2024-03-15
Payer: MEDICARE

## 2024-03-15 PROCEDURE — G0157 HHC PT ASSISTANT EA 15: HCPCS

## 2024-03-16 VITALS
HEART RATE: 89 BPM | TEMPERATURE: 96.9 F | DIASTOLIC BLOOD PRESSURE: 78 MMHG | SYSTOLIC BLOOD PRESSURE: 126 MMHG | OXYGEN SATURATION: 96 %

## 2024-03-16 NOTE — HOME HEALTH
Routine Visit Note:   Patient was in the living room and said come in.   Patient rated his pain as 8/10.    Skill/education provided: Patient was instructed in therapeutic exercises, safe transfers and ambulation with an appropriate assist device.    Patient/caregiver response: Patient/caregiver understood instructions and performed exercises well.    Plan for next visit:  Advance gait and strengthening exercise as patient pain and tolerance allows.

## 2024-03-18 ENCOUNTER — HOME CARE VISIT (OUTPATIENT)
Dept: HOME HEALTH SERVICES | Facility: HOME HEALTHCARE | Age: 69
End: 2024-03-18
Payer: MEDICARE

## 2024-03-18 PROCEDURE — G0157 HHC PT ASSISTANT EA 15: HCPCS

## 2024-03-19 VITALS
HEART RATE: 86 BPM | OXYGEN SATURATION: 99 % | DIASTOLIC BLOOD PRESSURE: 84 MMHG | SYSTOLIC BLOOD PRESSURE: 132 MMHG | TEMPERATURE: 97.5 F

## 2024-03-19 NOTE — HOME HEALTH
Routine Visit Note:   Patient was in the living room and said come in.   Patient rated his pain as 5/10.    Skill/education provided: Patient was instructed in therapeutic exercises, safe transfers and ambulation with an appropriate assist device.    Patient/caregiver response: Patient/caregiver understood instructions and performed exercises well.    Plan for next visit:  Advance gait and strengthening exercise as patient pain and tolerance allows.

## 2024-03-20 ENCOUNTER — HOME CARE VISIT (OUTPATIENT)
Dept: HOME HEALTH SERVICES | Facility: HOME HEALTHCARE | Age: 69
End: 2024-03-20
Payer: MEDICARE

## 2024-03-20 VITALS
TEMPERATURE: 96.9 F | SYSTOLIC BLOOD PRESSURE: 128 MMHG | OXYGEN SATURATION: 99 % | DIASTOLIC BLOOD PRESSURE: 80 MMHG | HEART RATE: 85 BPM

## 2024-03-20 PROCEDURE — G0157 HHC PT ASSISTANT EA 15: HCPCS

## 2024-03-22 ENCOUNTER — HOME CARE VISIT (OUTPATIENT)
Dept: HOME HEALTH SERVICES | Facility: HOME HEALTHCARE | Age: 69
End: 2024-03-22
Payer: MEDICARE

## 2024-03-22 PROCEDURE — G0151 HHCP-SERV OF PT,EA 15 MIN: HCPCS

## 2024-03-25 VITALS
OXYGEN SATURATION: 96 % | DIASTOLIC BLOOD PRESSURE: 76 MMHG | HEART RATE: 80 BPM | RESPIRATION RATE: 18 BRPM | SYSTOLIC BLOOD PRESSURE: 128 MMHG | TEMPERATURE: 97.6 F

## 2024-03-28 LAB — INR PPP: 2.8

## 2024-03-29 ENCOUNTER — ANTICOAGULATION VISIT (OUTPATIENT)
Dept: CARDIOLOGY | Facility: CLINIC | Age: 69
End: 2024-03-29
Payer: MEDICARE

## 2024-03-29 DIAGNOSIS — Z79.01 LONG TERM (CURRENT) USE OF ANTICOAGULANTS: ICD-10-CM

## 2024-03-29 DIAGNOSIS — I48.11 LONGSTANDING PERSISTENT ATRIAL FIBRILLATION: Primary | ICD-10-CM

## 2024-04-01 ENCOUNTER — TELEPHONE (OUTPATIENT)
Dept: CARDIOLOGY | Facility: CLINIC | Age: 69
End: 2024-04-01
Payer: MEDICARE

## 2024-04-01 NOTE — TELEPHONE ENCOUNTER
----- Message from Juarez Christina sent at 4/1/2024 12:12 PM EDT -----  Regarding: Current medications  Contact: [phone]  Good Afternoon….    Among my medications, am I supposed to be taking a daily baby aspirin?    Also,  is it ok to take Advil  with current medications?    Bill

## 2024-04-01 NOTE — TELEPHONE ENCOUNTER
Since he is on warfarin, he does not need to take aspirin. He should avoid NSAIDs, like Advil, as they can increase his risk of bleeding. He can take Tylenol if needed for pain relief. Please advise patient.

## 2024-04-13 LAB — INR PPP: 2.6

## 2024-04-15 ENCOUNTER — ANTICOAGULATION VISIT (OUTPATIENT)
Dept: CARDIOLOGY | Facility: CLINIC | Age: 69
End: 2024-04-15
Payer: MEDICARE

## 2024-04-15 DIAGNOSIS — I48.11 LONGSTANDING PERSISTENT ATRIAL FIBRILLATION: Primary | ICD-10-CM

## 2024-04-15 DIAGNOSIS — Z79.01 LONG TERM (CURRENT) USE OF ANTICOAGULANTS: ICD-10-CM

## 2024-04-26 ENCOUNTER — ANTICOAGULATION VISIT (OUTPATIENT)
Dept: CARDIOLOGY | Facility: CLINIC | Age: 69
End: 2024-04-26
Payer: MEDICARE

## 2024-04-26 DIAGNOSIS — I48.11 LONGSTANDING PERSISTENT ATRIAL FIBRILLATION: Primary | ICD-10-CM

## 2024-04-26 DIAGNOSIS — Z79.01 LONG TERM (CURRENT) USE OF ANTICOAGULANTS: ICD-10-CM

## 2024-04-26 LAB — INR PPP: 1.7

## 2024-05-10 LAB — INR PPP: 2.2

## 2024-05-13 ENCOUNTER — ANTICOAGULATION VISIT (OUTPATIENT)
Dept: CARDIOLOGY | Facility: CLINIC | Age: 69
End: 2024-05-13
Payer: MEDICARE

## 2024-05-13 DIAGNOSIS — I48.11 LONGSTANDING PERSISTENT ATRIAL FIBRILLATION: Primary | ICD-10-CM

## 2024-05-13 DIAGNOSIS — Z79.01 LONG TERM (CURRENT) USE OF ANTICOAGULANTS: ICD-10-CM

## 2024-05-29 ENCOUNTER — ANTICOAGULATION VISIT (OUTPATIENT)
Dept: CARDIOLOGY | Facility: CLINIC | Age: 69
End: 2024-05-29
Payer: MEDICARE

## 2024-05-29 DIAGNOSIS — Z79.01 LONG TERM (CURRENT) USE OF ANTICOAGULANTS: ICD-10-CM

## 2024-05-29 DIAGNOSIS — I48.11 LONGSTANDING PERSISTENT ATRIAL FIBRILLATION: Primary | ICD-10-CM

## 2024-05-29 LAB — INR PPP: 1.8

## 2024-05-31 ENCOUNTER — PATIENT MESSAGE (OUTPATIENT)
Dept: CARDIOLOGY | Facility: CLINIC | Age: 69
End: 2024-05-31
Payer: MEDICARE

## 2024-06-03 ENCOUNTER — TELEPHONE (OUTPATIENT)
Dept: CARDIOLOGY | Facility: CLINIC | Age: 69
End: 2024-06-03
Payer: MEDICARE

## 2024-06-03 NOTE — TELEPHONE ENCOUNTER
Patient Message with Denton Kirkland MD (05/31/2024)       Called spoke with pt advised INR 1.8 at last check advised continue current dose. Pt is having an occasional nose bleed advised warfarin will not cause nose bleed but will enhance nose bleed. Advised if Nose Bleed last longer then 30 mins pt needs to seek treatment. Pt states he is not having any issues getting nose bleed to stop.   Pt will recheck INR next week apLPN

## 2024-06-10 DIAGNOSIS — I48.0 PAROXYSMAL ATRIAL FIBRILLATION: ICD-10-CM

## 2024-06-10 RX ORDER — WARFARIN SODIUM 5 MG/1
TABLET ORAL
Qty: 103 TABLET | Refills: 0 | Status: SHIPPED | OUTPATIENT
Start: 2024-06-10

## 2024-06-10 NOTE — TELEPHONE ENCOUNTER
Rx Refill Note  Requested Prescriptions     Pending Prescriptions Disp Refills    warfarin (COUMADIN) 5 MG tablet [Pharmacy Med Name: WARFARIN SOD 5MG TABLETS (PEACH)] 103 tablet 0     Sig: TAKE 1 AND 1/2 TABLETS BY MOUTH ON MONDAY AND FRIDAY AND ONE TABLET BY MOUTH ALL OTHER DAYS OR AS DIRECTED  Indications: Atrial Fibrillation      Last office visit with prescribing clinician: 1/12/2024   Last telemedicine visit with prescribing clinician: Visit date not found   Next office visit with prescribing clinician: 7/8/2024     Anticoagulation Visit with Denton Kirkland MD (05/29/2024)     Mellisa Brooks LPN  06/10/24, 15:11 EDT

## 2024-06-15 LAB — INR PPP: 1.7

## 2024-06-17 ENCOUNTER — ANTICOAGULATION VISIT (OUTPATIENT)
Dept: CARDIOLOGY | Facility: CLINIC | Age: 69
End: 2024-06-17
Payer: MEDICARE

## 2024-06-17 DIAGNOSIS — Z79.01 LONG TERM (CURRENT) USE OF ANTICOAGULANTS: Primary | ICD-10-CM

## 2024-06-17 NOTE — PROGRESS NOTES
Spoke to patient, missed dose about 10 days ago. No changes in diet.Take extra 2.5 mg today and resume. Recheck 2 weeks as contracted.

## 2024-06-25 ENCOUNTER — HOSPITAL ENCOUNTER (EMERGENCY)
Facility: HOSPITAL | Age: 69
Discharge: HOME | End: 2024-06-25
Admitting: EMERGENCY MEDICINE
Payer: MEDICARE

## 2024-06-25 VITALS
BODY MASS INDEX: 31.72 KG/M2 | HEART RATE: 64 BPM | HEIGHT: 70 IN | DIASTOLIC BLOOD PRESSURE: 92 MMHG | SYSTOLIC BLOOD PRESSURE: 152 MMHG | TEMPERATURE: 98.3 F | OXYGEN SATURATION: 98 % | WEIGHT: 221.56 LBS | RESPIRATION RATE: 16 BRPM

## 2024-06-25 DIAGNOSIS — Z79.01 ON WARFARIN AT HOME: ICD-10-CM

## 2024-06-25 DIAGNOSIS — S61.210A LACERATION OF RIGHT INDEX FINGER WITHOUT FOREIGN BODY WITHOUT DAMAGE TO NAIL, INITIAL ENCOUNTER: Primary | ICD-10-CM

## 2024-06-25 LAB
INR PPP: 2.18 (ref 2–3)
PROTHROMBIN TIME: 22.4 SECONDS (ref 19.4–28.5)

## 2024-06-25 PROCEDURE — 36415 COLL VENOUS BLD VENIPUNCTURE: CPT

## 2024-06-25 PROCEDURE — 85610 PROTHROMBIN TIME: CPT | Performed by: NURSE PRACTITIONER

## 2024-06-25 PROCEDURE — 90471 IMMUNIZATION ADMIN: CPT | Performed by: NURSE PRACTITIONER

## 2024-06-25 PROCEDURE — 90715 TDAP VACCINE 7 YRS/> IM: CPT | Performed by: NURSE PRACTITIONER

## 2024-06-25 PROCEDURE — 99283 EMERGENCY DEPT VISIT LOW MDM: CPT

## 2024-06-25 PROCEDURE — 25010000002 TETANUS-DIPHTH-ACELL PERTUSSIS 5-2.5-18.5 LF-MCG/0.5 SUSPENSION PREFILLED SYRINGE: Performed by: NURSE PRACTITIONER

## 2024-06-25 RX ORDER — DIAPER,BRIEF,INFANT-TODD,DISP
1 EACH MISCELLANEOUS EVERY 12 HOURS SCHEDULED
Status: DISCONTINUED | OUTPATIENT
Start: 2024-06-25 | End: 2024-06-25 | Stop reason: HOSPADM

## 2024-06-25 RX ORDER — LIDOCAINE 40 MG/G
1 CREAM TOPICAL AS NEEDED
Status: DISCONTINUED | OUTPATIENT
Start: 2024-06-25 | End: 2024-06-25 | Stop reason: HOSPADM

## 2024-06-25 RX ORDER — LIDOCAINE HYDROCHLORIDE 10 MG/ML
10 INJECTION, SOLUTION EPIDURAL; INFILTRATION; INTRACAUDAL; PERINEURAL ONCE
Status: COMPLETED | OUTPATIENT
Start: 2024-06-25 | End: 2024-06-25

## 2024-06-25 RX ADMIN — BACITRACIN 0.9 G: 500 OINTMENT TOPICAL at 14:15

## 2024-06-25 RX ADMIN — TETANUS TOXOID, REDUCED DIPHTHERIA TOXOID AND ACELLULAR PERTUSSIS VACCINE, ADSORBED 0.5 ML: 5; 2.5; 8; 8; 2.5 SUSPENSION INTRAMUSCULAR at 12:26

## 2024-06-25 RX ADMIN — LIDOCAINE HYDROCHLORIDE 10 ML: 10 INJECTION, SOLUTION EPIDURAL; INFILTRATION; INTRACAUDAL; PERINEURAL at 12:29

## 2024-06-25 RX ADMIN — LIDOCAINE 4% 1 APPLICATION: 4 CREAM TOPICAL at 12:30

## 2024-06-25 NOTE — DISCHARGE INSTRUCTIONS
Wound care as instructed.  Tylenol as needed.  Watch for signs of infection.  Suture removal in 7 days.  Return for any new or worsening symptoms

## 2024-06-25 NOTE — ED PROVIDER NOTES
Subjective   History of Present Illness  Chief complaint: laceration      Context: Patient is a 68-year-old male who presents with his significant other with complaints of a right index finger laceration on a . LD warfarin yesterday. Denies numbness, unknown last td.         PCP: dues        Review of Systems   Constitutional:  Negative for fever.       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    History of kidney stones 05/27/2023    History of non-Hodgkin's lymphoma 05/27/2023    Hypertension     Immune deficiency disorder     Kidney stone     Macular degeneration     Mild pulmonary hypertension 05/27/2023    Mixed hyperlipidemia 05/27/2023    Moderate aortic valve stenosis 05/27/2023    Multi-organ failure with heart failure     history    Non Hodgkin's lymphoma     Obesity (BMI 30-39.9) 05/27/2023    Prediabetes 05/28/2023    Radiation necrosis of skin and subcutaneous     Sleep apnea 2003       No Known Allergies    Past Surgical History:   Procedure Laterality Date    BONE MARROW TRANSPLANT      CARDIAC CATHETERIZATION  1989    CHOLECYSTECTOMY      KNEE ASPIRATION      SMALL INTESTINE SURGERY      TOTAL SHOULDER REPLACEMENT         Family History   Problem Relation Age of Onset    Heart disease Mother     Hypertension Mother     Heart disease Father     Hypertension Father     Heart attack Maternal Grandfather     Asthma Brother     Heart disease Brother     Hypertension Brother     Heart disease Brother     Hypertension Brother     Hypertension  Brother        Social History     Socioeconomic History    Marital status:    Tobacco Use    Smoking status: Never    Smokeless tobacco: Never   Vaping Use    Vaping status: Never Used   Substance and Sexual Activity    Alcohol use: Yes     Alcohol/week: 10.0 standard drinks of alcohol     Types: 10 Cans of beer per week     Comment: daily    Drug use: No    Sexual activity: Yes     Partners: Female           Objective   Physical Exam    Due to significant overcrowding in the emergency department patient was evaluated by myself in a hallway bed.  Explained to the patient our limitations due to overcrowding and they were in agreement to continue exam and treatment at this time.     Vital signs and traige nurse note reviewed.  Constitutional:  Awake, alert; well developed and well nourished.  No acute distress  HEENT:  Normocephalic, atraumatic;  with intact EOM; oropharynx is pink and moist without edema or erythema.  Neck: Supple, full range of motion without pain;   Pulmonary: Respiratory effort regular, nonlabored;   Musculoskeletal: 2 cm laceration noted to the palmar aspect of the distal phalanx of the right index finger with minimal underlying swelling.  There is a superficial nonbleeding laceration just proximal to that without bleeding or underlying swelling.  Distal vascular and sensorimotor intact.  FDP and FDS is intact.  Nailbed injury  Neuro: Alert oriented x3, speech is clear and appropriate.      Laceration Repair    Date/Time: 6/25/2024 12:45 PM    Performed by: Jessica Hoang APRN  Authorized by: Jessica Hoang APRN    Consent:     Consent obtained:  Verbal    Consent given by:  Patient and spouse    Risks, benefits, and alternatives were discussed: yes      Risks discussed:  Infection, pain and need for additional repair    Alternatives discussed:  No treatment  Universal protocol:     Procedure explained and questions answered to patient or proxy's satisfaction: yes      Site/side  marked: yes      Immediately prior to procedure, a time out was called: yes      Patient identity confirmed:  Verbally with patient  Anesthesia:     Anesthesia method:  Topical application and local infiltration    Topical anesthetic:  EMLA cream    Local anesthetic:  Lidocaine 1% w/o epi  Laceration details:     Location:  Finger    Finger location:  R index finger    Length (cm):  2  Pre-procedure details:     Preparation:  Patient was prepped and draped in usual sterile fashion  Exploration:     Imaging outcome: foreign body not noted      Wound exploration: wound explored through full range of motion and entire depth of wound visualized    Treatment:     Area cleansed with:  Frank    Amount of cleaning:  Standard  Skin repair:     Repair method:  Sutures    Suture size:  5-0    Suture material:  Prolene    Suture technique:  Simple interrupted    Number of sutures:  5  Approximation:     Approximation:  Close  Repair type:     Repair type:  Simple  Post-procedure details:     Dressing:  Antibiotic ointment and non-adherent dressing    Procedure completion:  Tolerated well, no immediate complications  Comments:      Patient tolerated first 2 sutures well but was noted to not be fully anesthetized for remaining laceration repair and was injected with 1% lidocaine which she tolerated well.  Anesthesia was obtained and laceration was repaired.  Steri-Strips were placed on the 0.5 cm superficial laceration just proximal to the main 2 cm laceration.  Bacitracin was applied along with a nonadherent dressing.             ED Course      Labs Reviewed   PROTIME-INR - Normal     Medications   lidocaine (LMX) 4 % cream 1 Application (1 Application Topical Given 6/25/24 1230)   bacitracin ointment 0.9 g (0.9 g Topical Given 6/25/24 1415)   Tetanus-Diphth-Acell Pertussis (BOOSTRIX) injection 0.5 mL (0.5 mL Intramuscular Given 6/25/24 1226)   lidocaine PF 1% (XYLOCAINE) injection 10 mL (10 mL Other Given by Other 6/25/24  "1229)     No radiology results for the last day  Prior to Admission medications    Medication Sig Start Date End Date Taking? Authorizing Provider   atorvastatin (LIPITOR) 40 MG tablet Take 1 tablet by mouth Daily. 9/4/23   Jeanette Kern APRN   Cyanocobalamin (VITAMIN B-12 PO) Take  by mouth. Indications: B12 def    Johann Gómez MD   HYDROcodone-acetaminophen (NORCO) 7.5-325 MG per tablet Take 1 tablet by mouth Every 6 (Six) Hours As Needed for Moderate Pain or Severe Pain. Indications: Pain 3/9/24   Johann Gómez MD   lisinopril (PRINIVIL,ZESTRIL) 10 MG tablet Take 1 tablet by mouth Daily. 9/4/23   Jeanette Kern APRN   metoprolol succinate XL (TOPROL-XL) 50 MG 24 hr tablet Take 1 tablet by mouth Daily.  Patient taking differently: Take 0.5 tablets by mouth Daily. 9/4/23   Jeanette Kern APRN   multivitamin with minerals tablet tablet Take 1 tablet by mouth Daily. Indications: vit def    Johann Gómez MD   tamsulosin (Flomax) 0.4 MG capsule 24 hr capsule Take 1 capsule by mouth Daily. Indications: Dysfunction of the Urinary Bladder 3/9/24   Johann Gómez MD   warfarin (COUMADIN) 5 MG tablet TAKE 1 AND 1/2 TABLETS BY MOUTH ON MONDAY AND FRIDAY AND ONE TABLET BY MOUTH ALL OTHER DAYS OR AS DIRECTED  Indications: Atrial Fibrillation 6/10/24   Denton Kirkland MD                                            Medical Decision Making      /92   Pulse 64   Temp 98.3 °F (36.8 °C) (Oral)   Resp 16   Ht 177.8 cm (70\")   Wt 101 kg (221 lb 9 oz)   SpO2 98%   BMI 31.79 kg/m²          Radiology interpretation:  X-rays considered but not felt to be emergently warranted          Appropriate PPE worn during exam.  Seen in a yoon bed due to overcrowding and census.  Up-to-date on tetanus.  Notably had a subtherapeutic warfarin last week with an extra dose, INR was rechecked and was notably 2.1 today.  See above for procedure note.  Tolerated well.  Notified on wound care, states wife is a " nurse well acquainted with wound care and how to watch for signs of infection      i discussed findings with patient who voices understanding of discharge instructions, signs and symptoms requiring return to ED; discharged improved and in stable condition with follow up for re-evaluation.  This document is intended for medical expert use only. Reading of this document by patients and/or patient's family without participating medical staff guidance may result in misinterpretation and unintended morbidity.  Any interpretation of such data is the responsibility of the patient and/or family member responsible for the patient in concert with their primary or specialist providers, not to be left for sources of online searches such as [employer], [employer] or similar queries. Relying on these approaches to knowledge may result in misinterpretation, misguided goals of care and even death should patients or family members try recommendations outside of the realm of professional medical care in a supervised inpatient environment.         Problems Addressed:  Laceration of right index finger without foreign body without damage to nail, initial encounter: complicated acute illness or injury  On warfarin at home: complicated acute illness or injury    Risk  OTC drugs.  Prescription drug management.        Final diagnoses:   Laceration of right index finger without foreign body without damage to nail, initial encounter   On warfarin at home       ED Disposition  ED Disposition       ED Disposition   Discharge    Condition   Stable    Comment   --               Juventino Martinez MD  [address] [place] [address] [place] [facility]  [phone]      For suture removal 7 days         Medication List        Changed      metoprolol succinate XL 50 MG 24 hr tablet  Commonly known as: TOPROL-XL  Take 1 tablet by mouth Daily.  What changed: how much to take                 Jessica Hoang, APRN  06/25/24 1449

## 2024-06-26 ENCOUNTER — ANTICOAGULATION VISIT (OUTPATIENT)
Dept: CARDIOLOGY | Facility: CLINIC | Age: 69
End: 2024-06-26
Payer: MEDICARE

## 2024-06-26 DIAGNOSIS — I48.11 LONGSTANDING PERSISTENT ATRIAL FIBRILLATION: Primary | ICD-10-CM

## 2024-06-26 DIAGNOSIS — Z79.01 LONG TERM (CURRENT) USE OF ANTICOAGULANTS: ICD-10-CM

## 2024-07-04 NOTE — PROGRESS NOTES
Encounter Date:07/08/2024        Patient ID: Juarez Christina is a 68 y.o. male.      Chief Complaint:      History of Present Illness  68 years old man with hypertension, hyperlipidemia, aortic aneurysm, diabetes who was recently in the hospital with strokelike symptoms MRI showed small subacute infarct within left insula and tiny subacute infarct within the left caudate head and left high frontal lobe.  He was started on dual antiplatelet therapy, high intensity statin.  An echocardiogram suggested normal LV function with at least moderate aortic stenosis.  He was recommended to undergo an outpatient transesophageal echocardiogram to further evaluate his aortic valve and to also look for cardioembolic source for stroke.  HANNA showed mild aortic stenosis and moderate mitral valve regurgitation.  MCOT was also provided to him to look for atrial fibrillation since he has risk factors for arrhythmia.  Today he returns for follow-up.  Current cardiac medications include lisinopril, Toprol-XL, atorvastatin and warfarin.  He successfully underwent left knee replacement and is also lost 15 pounds.  Overall he feels well.    The following portions of the patient's history were reviewed and updated as appropriate: allergies, current medications, past family history, past medical history, past social history, past surgical history, and problem list.    Review of Systems   Constitutional: Negative for malaise/fatigue.   Cardiovascular:  Negative for chest pain, dyspnea on exertion, leg swelling and palpitations.   Respiratory:  Negative for cough and shortness of breath.    Gastrointestinal:  Negative for abdominal pain, nausea and vomiting.   Neurological:  Negative for dizziness, focal weakness, headaches, light-headedness and numbness.   All other systems reviewed and are negative.        Current Outpatient Medications:   •  atorvastatin (LIPITOR) 40 MG tablet, Take 1 tablet by mouth Daily., Disp: 90 tablet, Rfl: 3  •   Cyanocobalamin (VITAMIN B-12 PO), Take  by mouth. Indications: B12 def, Disp: , Rfl:   •  lisinopril (PRINIVIL,ZESTRIL) 10 MG tablet, Take 1 tablet by mouth Daily., Disp: 90 tablet, Rfl: 3  •  metoprolol succinate XL (TOPROL-XL) 50 MG 24 hr tablet, Take 1 tablet by mouth Daily. (Patient taking differently: Take 0.5 tablets by mouth Daily.), Disp: 90 tablet, Rfl: 3  •  multivitamin with minerals tablet tablet, Take 1 tablet by mouth Daily. Indications: vit def, Disp: , Rfl:   •  warfarin (COUMADIN) 5 MG tablet, TAKE 1 AND 1/2 TABLETS BY MOUTH ON MONDAY AND FRIDAY AND ONE TABLET BY MOUTH ALL OTHER DAYS OR AS DIRECTED  Indications: Atrial Fibrillation, Disp: 103 tablet, Rfl: 0    Current outpatient and discharge medications have been reconciled for the patient.  Reviewed by: Denton Kirkland MD       No Known Allergies    Family History   Problem Relation Age of Onset   • Heart disease Mother    • Hypertension Mother    • Heart disease Father    • Hypertension Father    • Heart attack Maternal Grandfather    • Asthma Brother    • Heart disease Brother    • Hypertension Brother    • Heart disease Brother    • Hypertension Brother    • Hypertension Brother        Past Surgical History:   Procedure Laterality Date   • BONE MARROW TRANSPLANT     • CARDIAC CATHETERIZATION  1989   • CHOLECYSTECTOMY     • KNEE ASPIRATION     • REPLACEMENT TOTAL KNEE Left 03/07/2024   • SMALL INTESTINE SURGERY     • TOTAL SHOULDER REPLACEMENT         Past Medical History:   Diagnosis Date   • Abnormal ECG 2021   • Acute mejdf-hjzcbi-wtvd disease    • Acute ischemic left MCA stroke 05/28/2023   • Arrhythmia 2021   • Atrial fibrillation 7/28/2023   • Cancer    • Cerebrovascular small vessel disease 05/28/2023   • Essential hypertension 05/27/2023   • Expressive aphasia 05/27/2023   • Fatty liver 05/27/2023   • H/O acute respiratory failure    • Hepatitis    • 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   • 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  "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   • History of kidney stones 05/27/2023   • History of non-Hodgkin's lymphoma 05/27/2023   • Hypertension    • Immune deficiency disorder    • Kidney stone    • Macular degeneration    • Mild pulmonary hypertension 05/27/2023   • Mixed hyperlipidemia 05/27/2023   • Moderate aortic valve stenosis 05/27/2023   • Multi-organ failure with heart failure     history   • Non Hodgkin's lymphoma    • Obesity (BMI 30-39.9) 05/27/2023   • Prediabetes 05/28/2023   • Radiation necrosis of skin and subcutaneous    • Sleep apnea 2003       Family History   Problem Relation Age of Onset   • Heart disease Mother    • Hypertension Mother    • Heart disease Father    • Hypertension Father    • Heart attack Maternal Grandfather    • Asthma Brother    • Heart disease Brother    • Hypertension Brother    • Heart disease Brother    • Hypertension Brother    • Hypertension Brother        Social History     Socioeconomic History   • Marital status:    Tobacco Use   • Smoking status: Never   • Smokeless tobacco: Never   Vaping Use   • Vaping status: Never Used   Substance and Sexual Activity   • Alcohol use: Yes     Alcohol/week: 10.0 standard drinks of alcohol     Types: 10 Cans of beer per week     Comment: daily   • Drug use: No   • Sexual activity: Yes     Partners: Female               Objective:       Physical Exam    /79 (BP Location: Left arm, Patient Position: Sitting)   Pulse 58   Ht 177.8 cm (70\")   Wt 99.3 kg (219 lb)   SpO2 97%   BMI 31.42 kg/m²   The patient is alert, oriented and in no distress.    Vital signs as noted above.    Head and neck revealed no carotid bruits or jugular venous distension.  No thyromegaly or lymphadenopathy is present.    Lungs clear.  No wheezing.  Breath sounds are normal bilaterally.    Heart normal first and second heart sounds.  Systolic ejection murmur at RUSB..  No pericardial rub is present.  No gallop is present.    Abdomen soft and nontender.  No organomegaly is " present.    Extremities revealed good peripheral pulses without any pedal edema.    Skin warm and dry.    Musculoskeletal system is grossly normal.    CNS grossly normal.           Diagnosis Plan   1. Longstanding persistent atrial fibrillation        2. Paroxysmal atrial fibrillation        3. Essential hypertension        4. Moderate aortic valve stenosis        5. Mixed hyperlipidemia        6. Prediabetes        7. History of non-Hodgkin's lymphoma        8. Obesity (BMI 30-39.9)        9. Transient ischemic attack        10. Aortic root aneurysm        11. B12 deficiency        12. Fatty liver        LAB RESULTS (LAST 7 DAYS)    CBC        BMP        CMP         BNP        TROPONIN        CoAg        Creatinine Clearance  CrCl cannot be calculated (Patient's most recent lab result is older than the maximum 30 days allowed.).    ABG        Radiology  No radiology results for the last day    EKG    ECG 12 Lead    Date/Time: 7/8/2024 3:26 PM  Performed by: Denton Kirkland MD    Authorized by: Denton Kirkland MD  Comparison: compared with previous ECG   Similar to previous ECG  Comments: ECG shows normal sinus rhythm with PAC.  Heart rate 58, MT interval 206, QRS duration 92 and QTc 416 ms.      Stress test      Echocardiogram  Results for orders placed during the hospital encounter of 06/26/23    Adult Transesophageal Echo (HANNA) W/ Cont if Necessary Per Protocol    Interpretation Summary  •  Left ventricular systolic function is low normal. Left ventricular ejection fraction appears to be 51 - 55%.  •  The left atrial cavity is moderately dilated.  •  Saline test results are negative.  •  Mild aortic valve stenosis is present.  •  Moderate mitral valve regurgitation is present.      Cardiac catheterization  No results found for this or any previous visit.          Assessment and Plan       Diagnoses and all orders for this visit:    1. Longstanding persistent atrial fibrillation (Primary)    2. Paroxysmal atrial  fibrillation    3. Essential hypertension    4. Moderate aortic valve stenosis    5. Mixed hyperlipidemia    6. Prediabetes    7. History of non-Hodgkin's lymphoma    8. Obesity (BMI 30-39.9)    9. Transient ischemic attack    10. Aortic root aneurysm    11. B12 deficiency    12. Fatty liver         Stroke  CT head with no acute intracranial abnormalities.  CTA with no hemodynamically significant stenosis.  MRI shows small subacute infarct within left insula and tiny subacute infarct within the left caudate head and left high frontal lobe.  HANNA showed mild aortic stenosis and moderate mitral regurgitation with enlarged left atrium.  MCOT confirmed atrial fibrillation which could have contributed to stroke.  He is now on anticoagulation with warfarin.     Atrial fibrillation (paroxysmal)  Paroxysmal atrial fibrillation diagnosed with MCOT  Patient was monitored for 27 days 15 hours and 29 minutes. Predominant rhythm was sinus. Average heart rate 68 bpm, lowest 50 and highest 151 bpm. Atrial fibrillation noted with a total burden of 3%. First-degree AV block at 55 bpm. No SVT or pauses. 14% PACs and 1% PVCs. 8 beat run of VT at 151 bpm. Clinical correlation is required. Consider anticoagulation for stroke prevention in the setting of atrial fibrillation.   [id number] score is 5  Continue warfarin with goal INR of 2-3  Continue metoprolol for rate control  Dose has been reduced due to persistent bradycardia.  Heart rate today is 58.     Aortic stenosis and ascending aortic aneurysm  Echocardiogram shows EF of 68%, diastolic dysfunction.  Bubble study is negative however he has calcified and restricted opening of aortic valve consistent with aortic stenosis.  CTA also showed ascending aortic aneurysm 4.2 cm.  HANNA showed mild aortic stenosis and moderate mitral regurgitation.  Repeat yearly echoes to follow-up on aortic stenosis and mitral regurgitation.     Hypertension  Blood pressure is elevated today  Continue ACE  inhibitor and beta-blocker  Needs blood pressure and heart rate control due to findings of aortic aneurysm.  Bradycardia is new for him and MCOT showed a lowest heart rate of 50 bpm  Metoprolol dose has been reduced  Will increase lisinopril      Hyperlipidemia  LDL 58, HDL 40, total cholesterol 147.  Continue high intensity statin  LDL goal is less than 70.     Diabetes  A1c 6.2  He should be started on metformin.  We will discuss starting Jardiance.     Obesity  BMI is down from 34-31.  His weight is down from 231 to 219 pounds.  Dietary changes and exercise discussed with the patient.     Preoperative cardiovascular risk assessment  Successfully underwent left knee replacement.  He is back on warfarin

## 2024-07-08 ENCOUNTER — OFFICE VISIT (OUTPATIENT)
Dept: CARDIOLOGY | Facility: CLINIC | Age: 69
End: 2024-07-08
Payer: MEDICARE

## 2024-07-08 VITALS
DIASTOLIC BLOOD PRESSURE: 79 MMHG | BODY MASS INDEX: 31.35 KG/M2 | HEIGHT: 70 IN | HEART RATE: 58 BPM | SYSTOLIC BLOOD PRESSURE: 154 MMHG | WEIGHT: 219 LBS | OXYGEN SATURATION: 97 %

## 2024-07-08 DIAGNOSIS — I10 ESSENTIAL HYPERTENSION: ICD-10-CM

## 2024-07-08 DIAGNOSIS — G45.9 TRANSIENT ISCHEMIC ATTACK: ICD-10-CM

## 2024-07-08 DIAGNOSIS — E66.9 OBESITY (BMI 30-39.9): ICD-10-CM

## 2024-07-08 DIAGNOSIS — K76.0 FATTY LIVER: ICD-10-CM

## 2024-07-08 DIAGNOSIS — I48.11 LONGSTANDING PERSISTENT ATRIAL FIBRILLATION: Primary | ICD-10-CM

## 2024-07-08 DIAGNOSIS — E78.2 MIXED HYPERLIPIDEMIA: ICD-10-CM

## 2024-07-08 DIAGNOSIS — Z85.72 HISTORY OF NON-HODGKIN'S LYMPHOMA: ICD-10-CM

## 2024-07-08 DIAGNOSIS — I71.21 AORTIC ROOT ANEURYSM: ICD-10-CM

## 2024-07-08 DIAGNOSIS — E53.8 B12 DEFICIENCY: ICD-10-CM

## 2024-07-08 DIAGNOSIS — R73.03 PREDIABETES: ICD-10-CM

## 2024-07-08 DIAGNOSIS — I48.0 PAROXYSMAL ATRIAL FIBRILLATION: ICD-10-CM

## 2024-07-08 DIAGNOSIS — I35.0 MODERATE AORTIC VALVE STENOSIS: ICD-10-CM

## 2024-07-08 PROCEDURE — 99214 OFFICE O/P EST MOD 30 MIN: CPT | Performed by: INTERNAL MEDICINE

## 2024-07-08 PROCEDURE — 1160F RVW MEDS BY RX/DR IN RCRD: CPT | Performed by: INTERNAL MEDICINE

## 2024-07-08 PROCEDURE — 1159F MED LIST DOCD IN RCRD: CPT | Performed by: INTERNAL MEDICINE

## 2024-07-08 PROCEDURE — 3078F DIAST BP <80 MM HG: CPT | Performed by: INTERNAL MEDICINE

## 2024-07-08 PROCEDURE — 93000 ELECTROCARDIOGRAM COMPLETE: CPT | Performed by: INTERNAL MEDICINE

## 2024-07-08 PROCEDURE — 3077F SYST BP >= 140 MM HG: CPT | Performed by: INTERNAL MEDICINE

## 2024-07-08 RX ORDER — LISINOPRIL 20 MG/1
20 TABLET ORAL DAILY
Qty: 90 TABLET | Refills: 3 | Status: SHIPPED | OUTPATIENT
Start: 2024-07-08

## 2024-07-15 LAB — INR PPP: 1.6

## 2024-07-16 ENCOUNTER — ANTICOAGULATION VISIT (OUTPATIENT)
Dept: CARDIOLOGY | Facility: CLINIC | Age: 69
End: 2024-07-16
Payer: MEDICARE

## 2024-07-16 DIAGNOSIS — Z79.01 LONG TERM (CURRENT) USE OF ANTICOAGULANTS: Primary | ICD-10-CM

## 2024-07-16 NOTE — PROGRESS NOTES
Spoke to patient, he increased lisinopril, no other medication changes. Does not eat green vegetables. Advised him to take extra 2.5 mg today and resume schedule. Check INR 2 weeks.

## 2024-08-01 ENCOUNTER — ANTICOAGULATION VISIT (OUTPATIENT)
Dept: CARDIOLOGY | Facility: CLINIC | Age: 69
End: 2024-08-01
Payer: MEDICARE

## 2024-08-01 DIAGNOSIS — I48.11 LONGSTANDING PERSISTENT ATRIAL FIBRILLATION: Primary | ICD-10-CM

## 2024-08-01 DIAGNOSIS — Z79.01 LONG TERM (CURRENT) USE OF ANTICOAGULANTS: ICD-10-CM

## 2024-08-01 LAB — INR PPP: 1.7

## 2024-08-14 LAB — INR PPP: 2.1

## 2024-08-15 ENCOUNTER — ANTICOAGULATION VISIT (OUTPATIENT)
Dept: CARDIOLOGY | Facility: CLINIC | Age: 69
End: 2024-08-15
Payer: MEDICARE

## 2024-08-15 DIAGNOSIS — Z79.01 LONG TERM (CURRENT) USE OF ANTICOAGULANTS: ICD-10-CM

## 2024-08-15 DIAGNOSIS — I48.11 LONGSTANDING PERSISTENT ATRIAL FIBRILLATION: Primary | ICD-10-CM

## 2024-09-01 ENCOUNTER — PATIENT MESSAGE (OUTPATIENT)
Dept: CARDIOLOGY | Facility: CLINIC | Age: 69
End: 2024-09-01
Payer: MEDICARE

## 2024-09-01 LAB — INR PPP: 1.4

## 2024-09-03 ENCOUNTER — ANTICOAGULATION VISIT (OUTPATIENT)
Dept: CARDIOLOGY | Facility: CLINIC | Age: 69
End: 2024-09-03
Payer: MEDICARE

## 2024-09-03 DIAGNOSIS — Z79.01 LONG TERM (CURRENT) USE OF ANTICOAGULANTS: ICD-10-CM

## 2024-09-03 DIAGNOSIS — I48.11 LONGSTANDING PERSISTENT ATRIAL FIBRILLATION: Primary | ICD-10-CM

## 2024-09-03 NOTE — TELEPHONE ENCOUNTER
From: Juarez Christina  Sent: 9/1/2024 8:47 PM EDT  To: Wendi Diaz [facility]  Subject: Latest INR    Correction... INR was 1.4

## 2024-09-03 NOTE — PROGRESS NOTES
Spoke to pt, advised him to continue current dosage of Warfarin since he started antibiotics and steroids. Both of these can raise INR, so may want to recheck this Friday before going out of town. Rogelio

## 2024-09-05 LAB — INR PPP: 2.2

## 2024-09-06 ENCOUNTER — ANTICOAGULATION VISIT (OUTPATIENT)
Dept: CARDIOLOGY | Facility: CLINIC | Age: 69
End: 2024-09-06
Payer: MEDICARE

## 2024-09-06 DIAGNOSIS — Z79.01 LONG TERM (CURRENT) USE OF ANTICOAGULANTS: ICD-10-CM

## 2024-09-06 DIAGNOSIS — I48.11 LONGSTANDING PERSISTENT ATRIAL FIBRILLATION: Primary | ICD-10-CM

## 2024-09-18 ENCOUNTER — ANTICOAGULATION VISIT (OUTPATIENT)
Dept: CARDIOLOGY | Facility: CLINIC | Age: 69
End: 2024-09-18
Payer: MEDICARE

## 2024-09-18 ENCOUNTER — PATIENT MESSAGE (OUTPATIENT)
Dept: CARDIOLOGY | Facility: CLINIC | Age: 69
End: 2024-09-18
Payer: MEDICARE

## 2024-09-18 DIAGNOSIS — Z79.01 LONG TERM (CURRENT) USE OF ANTICOAGULANTS: ICD-10-CM

## 2024-09-18 DIAGNOSIS — I48.11 LONGSTANDING PERSISTENT ATRIAL FIBRILLATION: Primary | ICD-10-CM

## 2024-09-18 LAB — INR PPP: 1.6

## 2024-09-27 ENCOUNTER — TELEPHONE (OUTPATIENT)
Dept: CARDIOLOGY | Facility: CLINIC | Age: 69
End: 2024-09-27
Payer: MEDICARE

## 2024-09-27 DIAGNOSIS — I48.0 PAROXYSMAL ATRIAL FIBRILLATION: ICD-10-CM

## 2024-09-27 RX ORDER — METOPROLOL SUCCINATE 25 MG/1
25 TABLET, EXTENDED RELEASE ORAL DAILY
Qty: 90 TABLET | Refills: 1 | Status: SHIPPED | OUTPATIENT
Start: 2024-09-27

## 2024-09-27 RX ORDER — WARFARIN SODIUM 5 MG/1
TABLET ORAL
Qty: 116 TABLET | Refills: 0 | Status: SHIPPED | OUTPATIENT
Start: 2024-09-27

## 2024-10-06 LAB — INR PPP: 2.2

## 2024-10-07 ENCOUNTER — ANTICOAGULATION VISIT (OUTPATIENT)
Dept: CARDIOLOGY | Facility: CLINIC | Age: 69
End: 2024-10-07
Payer: MEDICARE

## 2024-10-07 DIAGNOSIS — Z79.01 LONG TERM (CURRENT) USE OF ANTICOAGULANTS: ICD-10-CM

## 2024-10-07 DIAGNOSIS — I48.11 LONGSTANDING PERSISTENT ATRIAL FIBRILLATION: Primary | ICD-10-CM

## 2024-10-25 ENCOUNTER — ANTICOAGULATION VISIT (OUTPATIENT)
Dept: CARDIOLOGY | Facility: CLINIC | Age: 69
End: 2024-10-25
Payer: MEDICARE

## 2024-10-25 DIAGNOSIS — I48.11 LONGSTANDING PERSISTENT ATRIAL FIBRILLATION: Primary | ICD-10-CM

## 2024-10-25 DIAGNOSIS — Z79.01 LONG TERM (CURRENT) USE OF ANTICOAGULANTS: ICD-10-CM

## 2024-10-25 LAB — INR PPP: 1.4

## 2024-10-28 RX ORDER — LISINOPRIL 10 MG/1
10 TABLET ORAL DAILY
Qty: 90 TABLET | Refills: 3 | OUTPATIENT
Start: 2024-10-28

## 2024-11-04 ENCOUNTER — HOSPITAL ENCOUNTER (OUTPATIENT)
Dept: CARDIOLOGY | Facility: HOSPITAL | Age: 69
Discharge: HOME | End: 2024-11-04
Admitting: INTERNAL MEDICINE
Payer: MEDICARE

## 2024-11-04 VITALS
WEIGHT: 219 LBS | BODY MASS INDEX: 31.35 KG/M2 | HEIGHT: 70 IN | DIASTOLIC BLOOD PRESSURE: 85 MMHG | SYSTOLIC BLOOD PRESSURE: 155 MMHG

## 2024-11-04 LAB
AORTIC DIMENSIONLESS INDEX: 0.29 (DI)
BH CV ECHO MEAS - AO MAX PG: 22 MMHG
BH CV ECHO MEAS - AO MEAN PG: 12.7 MMHG
BH CV ECHO MEAS - AO ROOT DIAM: 3.7 CM
BH CV ECHO MEAS - AO V2 MAX: 234.2 CM/SEC
BH CV ECHO MEAS - AO V2 VTI: 60.3 CM
BH CV ECHO MEAS - AVA(I,D): 0.94 CM2
BH CV ECHO MEAS - EDV(CUBED): 125.9 ML
BH CV ECHO MEAS - EDV(MOD-SP2): 102.3 ML
BH CV ECHO MEAS - EDV(MOD-SP4): 92.8 ML
BH CV ECHO MEAS - EF(MOD-BP): 65 %
BH CV ECHO MEAS - EF(MOD-SP2): 64.2 %
BH CV ECHO MEAS - EF(MOD-SP4): 67.2 %
BH CV ECHO MEAS - ESV(CUBED): 35.6 ML
BH CV ECHO MEAS - ESV(MOD-SP2): 36.7 ML
BH CV ECHO MEAS - ESV(MOD-SP4): 30.4 ML
BH CV ECHO MEAS - FS: 34.4 %
BH CV ECHO MEAS - IVS/LVPW: 0.99 CM
BH CV ECHO MEAS - IVSD: 1.23 CM
BH CV ECHO MEAS - LA DIMENSION: 4 CM
BH CV ECHO MEAS - LV MASS(C)D: 243.4 GRAMS
BH CV ECHO MEAS - LV MAX PG: 1.78 MMHG
BH CV ECHO MEAS - LV MEAN PG: 0.98 MMHG
BH CV ECHO MEAS - LV V1 MAX: 66.8 CM/SEC
BH CV ECHO MEAS - LV V1 VTI: 17.5 CM
BH CV ECHO MEAS - LVIDD: 5 CM
BH CV ECHO MEAS - LVIDS: 3.3 CM
BH CV ECHO MEAS - LVOT AREA: 3.2 CM2
BH CV ECHO MEAS - LVOT DIAM: 2.03 CM
BH CV ECHO MEAS - LVPWD: 1.24 CM
BH CV ECHO MEAS - MR MAX PG: 105.3 MMHG
BH CV ECHO MEAS - MR MAX VEL: 513 CM/SEC
BH CV ECHO MEAS - MV A DUR: 0.17 SEC
BH CV ECHO MEAS - MV A MAX VEL: 105.2 CM/SEC
BH CV ECHO MEAS - MV DEC SLOPE: 455 CM/SEC2
BH CV ECHO MEAS - MV DEC TIME: 0.32 SEC
BH CV ECHO MEAS - MV E MAX VEL: 144 CM/SEC
BH CV ECHO MEAS - MV E/A: 1.37
BH CV ECHO MEAS - MV MAX PG: 9.5 MMHG
BH CV ECHO MEAS - MV MEAN PG: 3.8 MMHG
BH CV ECHO MEAS - MV V2 VTI: 50.9 CM
BH CV ECHO MEAS - MVA(VTI): 1.11 CM2
BH CV ECHO MEAS - PA ACC TIME: 0.08 SEC
BH CV ECHO MEAS - PA V2 MAX: 104 CM/SEC
BH CV ECHO MEAS - PULM A REVS DUR: 0.18 SEC
BH CV ECHO MEAS - PULM A REVS VEL: 29.9 CM/SEC
BH CV ECHO MEAS - PULM DIAS VEL: 27.3 CM/SEC
BH CV ECHO MEAS - PULM S/D: 0.97
BH CV ECHO MEAS - PULM SYS VEL: 26.5 CM/SEC
BH CV ECHO MEAS - RAP SYSTOLE: 8 MMHG
BH CV ECHO MEAS - RV MAX PG: 1.45 MMHG
BH CV ECHO MEAS - RV V1 MAX: 60.3 CM/SEC
BH CV ECHO MEAS - RV V1 VTI: 12.9 CM
BH CV ECHO MEAS - RVDD: 3.9 CM
BH CV ECHO MEAS - RVSP: 37.6 MMHG
BH CV ECHO MEAS - SV(LVOT): 56.4 ML
BH CV ECHO MEAS - SV(MOD-SP2): 65.6 ML
BH CV ECHO MEAS - SV(MOD-SP4): 62.4 ML
BH CV ECHO MEAS - TR MAX PG: 29.6 MMHG
BH CV ECHO MEAS - TR MAX VEL: 264.6 CM/SEC

## 2024-11-04 PROCEDURE — 93306 TTE W/DOPPLER COMPLETE: CPT | Performed by: INTERNAL MEDICINE

## 2024-11-04 PROCEDURE — 93306 TTE W/DOPPLER COMPLETE: CPT

## 2024-11-15 ENCOUNTER — ANTICOAGULATION VISIT (OUTPATIENT)
Dept: CARDIOLOGY | Facility: CLINIC | Age: 69
End: 2024-11-15
Payer: MEDICARE

## 2024-11-15 ENCOUNTER — TELEPHONE (OUTPATIENT)
Dept: CARDIOLOGY | Facility: CLINIC | Age: 69
End: 2024-11-15
Payer: MEDICARE

## 2024-11-15 DIAGNOSIS — Z79.01 LONG TERM (CURRENT) USE OF ANTICOAGULANTS: ICD-10-CM

## 2024-11-15 DIAGNOSIS — I48.11 LONGSTANDING PERSISTENT ATRIAL FIBRILLATION: Primary | ICD-10-CM

## 2024-11-15 LAB — INR PPP: 1.9

## 2024-11-15 NOTE — TELEPHONE ENCOUNTER
Called pharmacy to have them run medication for cost purposes. Eliquis requires PA and per pt drug plan Xarelto is the preferred drug. I cancelled Eliquis RX at the pt local pharmacy. Please send Xarelto instead of Eliquis.       Attempted to call patient unable to reach the patient. Will discuss with pt on Monday due to safety purposes and how to transition to alternative blood thinner. Jose send RX to pharmacy on Monday      Thanks

## 2024-11-15 NOTE — TELEPHONE ENCOUNTER
Pt calling his INR is low at 1.9 today. Even after frequent adjustments his INR fluctuates. Pt is asking if his inconsistent INR would make an argument to switch to eliquis. Advised pt from an insurance standpoint the fluctuation would not change the cost of the medication. IF medication is not the preferred medication or tier 1 drug it would not change the cost. Pt states when it was previously prescribed it cost $2000 a month. I advised pt out of range INR's would not automatically make insurance pay for the alternative drug. Pt wanted to know if we could appeal for the drug. Advised if it is a matter of benefit level and it is not that the med is not covered an appeal would not change the cost.       Can we send an RX for Eliquis to see if the cost of the drug has changed due to the difference in pt deductible?

## 2024-11-19 NOTE — TELEPHONE ENCOUNTER
Spoke with pt advised we will send Xarelto and see how much it cost since that is the preferred med on his drug plan. Advised pt  I am sending the RX he should not take warfarin and xarelto together. Advised if Xarelto is really expensive do not  RX. Pt verbalizes understanding.       Please advise/send appropriate RX of Xarelto for patient

## 2024-11-20 ENCOUNTER — PATIENT MESSAGE (OUTPATIENT)
Dept: CARDIOLOGY | Facility: CLINIC | Age: 69
End: 2024-11-20
Payer: MEDICARE

## 2024-11-20 RX ORDER — ATORVASTATIN CALCIUM 40 MG/1
40 TABLET, FILM COATED ORAL DAILY
Qty: 90 TABLET | Refills: 3 | Status: SHIPPED | OUTPATIENT
Start: 2024-11-20

## 2024-11-20 NOTE — TELEPHONE ENCOUNTER
I discontinued Eliquis and ordered Xarelto 20 mg daily with dinner. New prescription sent to his pharmacy.

## 2024-11-22 NOTE — TELEPHONE ENCOUNTER
Xarelto Is $491 for a 90 day supply. [employer] message response sent to patient regarding cost. Placed RX on hold at pharmacy until pt decides what he wants to do so there are not safety concerns with having 2 blood thinner RX's on file.

## 2024-12-02 ENCOUNTER — ANTICOAGULATION VISIT (OUTPATIENT)
Dept: CARDIOLOGY | Facility: CLINIC | Age: 69
End: 2024-12-02
Payer: MEDICARE

## 2024-12-02 DIAGNOSIS — I48.11 LONGSTANDING PERSISTENT ATRIAL FIBRILLATION: Primary | ICD-10-CM

## 2024-12-02 DIAGNOSIS — Z79.01 LONG TERM (CURRENT) USE OF ANTICOAGULANTS: ICD-10-CM

## 2024-12-02 LAB — INR PPP: 2.1

## 2024-12-17 ENCOUNTER — ANTICOAGULATION VISIT (OUTPATIENT)
Dept: CARDIOLOGY | Facility: CLINIC | Age: 69
End: 2024-12-17
Payer: MEDICARE

## 2024-12-17 DIAGNOSIS — I48.11 LONGSTANDING PERSISTENT ATRIAL FIBRILLATION: Primary | ICD-10-CM

## 2024-12-17 DIAGNOSIS — Z79.01 LONG TERM (CURRENT) USE OF ANTICOAGULANTS: ICD-10-CM

## 2024-12-17 LAB — INR PPP: 2.6

## 2024-12-29 ENCOUNTER — PATIENT MESSAGE (OUTPATIENT)
Dept: CARDIOLOGY | Facility: CLINIC | Age: 69
End: 2024-12-29
Payer: MEDICARE

## 2024-12-29 DIAGNOSIS — I48.0 PAROXYSMAL ATRIAL FIBRILLATION: ICD-10-CM

## 2024-12-30 RX ORDER — WARFARIN SODIUM 5 MG/1
TABLET ORAL
Qty: 116 TABLET | Refills: 0 | Status: SHIPPED | OUTPATIENT
Start: 2024-12-30

## 2024-12-31 LAB — INR PPP: 2.8

## 2025-01-02 ENCOUNTER — ANTICOAGULATION VISIT (OUTPATIENT)
Dept: CARDIOLOGY | Facility: CLINIC | Age: 70
End: 2025-01-02
Payer: MEDICARE

## 2025-01-02 DIAGNOSIS — I48.11 LONGSTANDING PERSISTENT ATRIAL FIBRILLATION: Primary | ICD-10-CM

## 2025-01-02 DIAGNOSIS — Z79.01 LONG TERM (CURRENT) USE OF ANTICOAGULANTS: ICD-10-CM

## 2025-01-19 LAB — INR PPP: 2.2

## 2025-01-20 ENCOUNTER — ANTICOAGULATION VISIT (OUTPATIENT)
Dept: CARDIOLOGY | Facility: CLINIC | Age: 70
End: 2025-01-20
Payer: MEDICARE

## 2025-01-20 DIAGNOSIS — I48.11 LONGSTANDING PERSISTENT ATRIAL FIBRILLATION: Primary | ICD-10-CM

## 2025-01-20 DIAGNOSIS — Z79.01 LONG TERM (CURRENT) USE OF ANTICOAGULANTS: ICD-10-CM

## 2025-02-07 LAB — INR PPP: 2.5

## 2025-02-10 ENCOUNTER — ANTICOAGULATION VISIT (OUTPATIENT)
Dept: CARDIOLOGY | Facility: CLINIC | Age: 70
End: 2025-02-10
Payer: MEDICARE

## 2025-02-10 DIAGNOSIS — I48.11 LONGSTANDING PERSISTENT ATRIAL FIBRILLATION: Primary | ICD-10-CM

## 2025-02-10 DIAGNOSIS — Z79.01 LONG TERM (CURRENT) USE OF ANTICOAGULANTS: ICD-10-CM

## 2025-03-03 ENCOUNTER — ANTICOAGULATION VISIT (OUTPATIENT)
Dept: CARDIOLOGY | Facility: CLINIC | Age: 70
End: 2025-03-03
Payer: MEDICARE

## 2025-03-03 DIAGNOSIS — Z79.01 LONG TERM (CURRENT) USE OF ANTICOAGULANTS: Primary | ICD-10-CM

## 2025-03-03 LAB — INR PPP: 2

## 2025-03-13 DIAGNOSIS — I48.0 PAROXYSMAL ATRIAL FIBRILLATION: ICD-10-CM

## 2025-03-14 RX ORDER — WARFARIN SODIUM 5 MG/1
TABLET ORAL
Qty: 116 TABLET | Refills: 0 | Status: SHIPPED | OUTPATIENT
Start: 2025-03-14

## 2025-03-14 NOTE — TELEPHONE ENCOUNTER
Rx Refill Note  Requested Prescriptions     Pending Prescriptions Disp Refills    warfarin (COUMADIN) 5 MG tablet [Pharmacy Med Name: WARFARIN SOD 5MG TABLETS (PEACH)] 116 tablet 0     Sig: TAKE 1 AND 1/2 TABLETS BY MOUTH DAILY. EXCEPT 1 TABLET ON TUES AND THURS OR AS DIRECTED    Last INR 3/3/25  Last office visit with prescribing clinician: 7/8/2024   Last telemedicine visit with prescribing clinician: Visit date not found   Next office visit with prescribing clinician: 4/9/2025                         Would you like a call back once the refill request has been completed: [] Yes [] No    If the office needs to give you a call back, can they leave a voicemail: [] Yes [] No    Leeanna Javier RN  03/14/25, 15:25 EDT

## 2025-03-20 ENCOUNTER — ANTICOAGULATION VISIT (OUTPATIENT)
Dept: CARDIOLOGY | Facility: CLINIC | Age: 70
End: 2025-03-20
Payer: MEDICARE

## 2025-03-20 DIAGNOSIS — Z79.01 LONG TERM (CURRENT) USE OF ANTICOAGULANTS: ICD-10-CM

## 2025-03-20 DIAGNOSIS — I48.11 LONGSTANDING PERSISTENT ATRIAL FIBRILLATION: Primary | ICD-10-CM

## 2025-03-20 LAB — INR PPP: 2.7

## 2025-03-20 NOTE — PROGRESS NOTES
Patient's INR - 2.7, completed on 03/20/25. Continue current dose and recheck as contracted. dvLPN

## 2025-03-28 NOTE — PROGRESS NOTES
Encounter Date:04/09/2025        Patient ID: Juarez Christina is a 69 y.o. male.      Chief Complaint:      History of Present Illness  69 years old man with hypertension, hyperlipidemia, aortic aneurysm, diabetes who was previously in the hospital with strokelike symptoms MRI showed small subacute infarct within left insula and tiny subacute infarct within the left caudate head and left high frontal lobe.  He was started on dual antiplatelet therapy, high intensity statin.  An echocardiogram suggested normal LV function with at least moderate aortic stenosis.  He was recommended to undergo an outpatient transesophageal echocardiogram to further evaluate his aortic valve and to also look for cardioembolic source for stroke.  HANNA showed mild aortic stenosis and moderate mitral valve regurgitation.  MCOT was also provided to him to look for atrial fibrillation since he has risk factors for arrhythmia.  Today he returns for follow-up.    Current cardiac medications include atorvastatin, lisinopril, Toprol-XL and warfarin.    The following portions of the patient's history were reviewed and updated as appropriate: allergies, current medications, past family history, past medical history, past social history, past surgical history, and problem list.    Review of Systems   Constitutional: Negative for malaise/fatigue.   Cardiovascular:  Negative for chest pain, dyspnea on exertion, leg swelling and palpitations.   Respiratory:  Negative for cough and shortness of breath.    Gastrointestinal:  Negative for abdominal pain, nausea and vomiting.   Neurological:  Positive for dizziness and light-headedness. Negative for focal weakness, headaches and numbness.   All other systems reviewed and are negative.        Current Outpatient Medications:     vitamin B-12 (CYANOCOBALAMIN) 100 MCG tablet, Take 1 tablet by mouth Daily., Disp: , Rfl:     atorvastatin (LIPITOR) 40 MG tablet, Take 1 tablet by mouth Daily. Indications: High  Amount of Fats in the Blood, Disp: 90 tablet, Rfl: 3    lisinopril (PRINIVIL,ZESTRIL) 20 MG tablet, Take 1 tablet by mouth Daily. Indications: High Blood Pressure Disorder, Disp: 90 tablet, Rfl: 3    metoprolol succinate XL (TOPROL-XL) 25 MG 24 hr tablet, Take 1 tablet by mouth Daily., Disp: 90 tablet, Rfl: 1    multivitamin with minerals tablet tablet, Take 1 tablet by mouth Daily. AREDS  Indications: vit def, Disp: , Rfl:     warfarin (COUMADIN) 5 MG tablet, TAKE 1 AND 1/2 TABLETS BY MOUTH DAILY. EXCEPT 1 TABLET ON TUES AND TH OR AS DIRECTED, Disp: 116 tablet, Rfl: 0    Current outpatient and discharge medications have been reconciled for the patient.  Reviewed by: Denton Kirkland MD       No Known Allergies    Family History   Problem Relation Age of Onset    Heart disease Mother     Hypertension Mother     Heart attack Mother     Heart disease Father     Hypertension Father     No Known Problems Sister     Asthma Brother     Heart disease Brother     Hypertension Brother     Heart attack Brother     Parkinsonism Brother     Heart disease Brother     Heart attack Brother     Hypertension Brother     Hypertension Brother     No Known Problems Maternal Aunt     No Known Problems Maternal Uncle     No Known Problems Paternal Aunt     No Known Problems Paternal Uncle     No Known Problems Maternal Grandmother     Heart attack Maternal Grandfather     No Known Problems Paternal Grandmother     No Known Problems Paternal Grandfather     No Known Problems Other     Ataxia Brother         . Had brain stem encephalitis    Heart disease Maternal Uncle     Anemia Neg Hx     Arrhythmia Neg Hx     Clotting disorder Neg Hx     Fainting Neg Hx     Heart failure Neg Hx     Hyperlipidemia Neg Hx        Past Surgical History:   Procedure Laterality Date    BONE MARROW TRANSPLANT      CARDIAC CATHETERIZATION      CEREBRAL ANGIOGRAM      CHOLECYSTECTOMY      KNEE ASPIRATION      REPLACEMENT TOTAL KNEE Left 2024     SMALL INTESTINE SURGERY      TOTAL SHOULDER REPLACEMENT         Past Medical History:   Diagnosis Date    Abnormal ECG 2021    Acute [id number] disease     Acute ischemic left MCA stroke 05/28/2023    Arrhythmia 2021    Atrial fibrillation 07/28/2023    Cancer     Cerebrovascular small vessel disease 05/28/2023    Difficulty walking Arthritic joints    Essential hypertension 05/27/2023    Expressive aphasia 05/27/2023    Fatty liver 05/27/2023    H/O acute respiratory failure     Hepatitis     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    History of kidney stones 05/27/2023    History of non-Hodgkin's lymphoma 05/27/2023    Hypertension     Immune deficiency disorder     Kidney stone     Macular degeneration     Mild pulmonary hypertension 05/27/2023    Mixed hyperlipidemia 05/27/2023    Moderate aortic valve stenosis 05/27/2023    Multi-organ failure with heart failure     history    Non Hodgkin's lymphoma     Obesity (BMI 30-39.9) 05/27/2023    Prediabetes 05/28/2023    Radiation necrosis of skin and subcutaneous     Sleep apnea 2003    Vision loss     Macular degeneration       Family History   Problem Relation Age of Onset    Heart disease Mother     Hypertension Mother     Heart attack Mother     Heart disease Father     Hypertension Father     No Known Problems Sister     Asthma Brother     Heart disease Brother     Hypertension Brother     Heart attack Brother     Parkinsonism Brother     Heart disease Brother     Heart attack Brother     Hypertension Brother     Hypertension Brother     No Known Problems Maternal Aunt     No Known Problems Maternal Uncle     No Known Problems Paternal Aunt     No Known Problems Paternal Uncle     No Known Problems Maternal Grandmother     Heart attack Maternal Grandfather     No Known Problems Paternal Grandmother     No Known Problems Paternal Grandfather     No Known Problems Other     Ataxia Brother          ". Had brain stem encephalitis    Heart disease Maternal Uncle     Anemia Neg Hx     Arrhythmia Neg Hx     Clotting disorder Neg Hx     Fainting Neg Hx     Heart failure Neg Hx     Hyperlipidemia Neg Hx        Social History     Socioeconomic History    Marital status:    Tobacco Use    Smoking status: Never     Passive exposure: Never    Smokeless tobacco: Never    Tobacco comments:     Never used in any form.   Vaping Use    Vaping status: Never Used   Substance and Sexual Activity    Alcohol use: Yes     Alcohol/week: 10.0 standard drinks of alcohol     Types: 10 Cans of beer per week     Comment: daily    Drug use: No    Sexual activity: Yes     Partners: Female               Objective:       Physical Exam    /78 (BP Location: Right arm, Patient Position: Sitting, Cuff Size: Adult)   Pulse 66   Ht 177.8 cm (70\")   Wt 99.3 kg (219 lb)   SpO2 99%   BMI 31.42 kg/m²   The patient is alert, oriented and in no distress.    Vital signs as noted above.    Head and neck revealed no carotid bruits or jugular venous distension.  No thyromegaly or lymphadenopathy is present.    Lungs clear.  No wheezing.  Breath sounds are normal bilaterally.    Heart normal first and second heart sounds.  No murmur..  No pericardial rub is present.  No gallop is present.    Abdomen soft and nontender.  No organomegaly is present.    Extremities revealed good peripheral pulses without any pedal edema.    Skin warm and dry.    Musculoskeletal system is grossly normal.    CNS grossly normal.           Diagnosis Plan   1. Transient ischemic attack        2. Paroxysmal atrial fibrillation        3. Mixed hyperlipidemia        4. Moderate aortic valve stenosis        5. Essential hypertension        6. Prediabetes        7. History of non-Hodgkin's lymphoma        8. Obesity (BMI 30-39.9)        9. Aortic root aneurysm        10. B12 deficiency        11. Fatty liver        12. Acute ischemic left MCA stroke        13. " Longstanding persistent atrial fibrillation        LAB RESULTS (LAST 7 DAYS)    CBC        BMP        CMP         BNP        TROPONIN        CoAg        Creatinine Clearance  CrCl cannot be calculated (Patient's most recent lab result is older than the maximum 30 days allowed.).    ABG        Radiology  No radiology results for the last day    EKG  Procedures    Stress test      Echocardiogram  Results for orders placed in visit on 07/08/24    Adult Transthoracic Echo Complete W/ Cont if Necessary Per Protocol    Interpretation Summary    Left ventricular ejection fraction appears to be 61 - 65%.    Estimated right ventricular systolic pressure from tricuspid regurgitation is mildly elevated (35-45 mmHg).    Indications  Valvular function.    Technically satisfactory study.  Mitral valve is structurally normal.  Mitral annular calcification is present.  Mild mitral regurgitation is present.  Tricuspid valve is structurally normal.  Mild tricuspid regurgitation is present.  Aortic valve is thickened with significantly decreased opening motion.  Gradient across the aortic valve 20/13 mmHg and valve area of 0.94 cm².  Mild aortic regurgitation is present.  Pulmonic valve could not be well visualized.  Minimal pulmonic regurgitation is present.  Mild pulmonary hypertension is present (38 mmHg)  Left atrium is normal in size.  Right atrium is normal in size.  Left ventricle is normal in size and contractility with ejection fraction of 60%.  Moderate eccentric left ventricular hypertrophy is present.  Normal left ventricular diastolic function (MV E/A ratio 1.37)  Right ventricular enlargement is present with normal contractility.  TAPSE 2.0 cm..  Atrial septum is intact.  Aorta is normal.  IVC size is normal.  Right atrial pressure 8 mmHg.  No pericardial effusion or intracardiac thrombus is seen.    Impression  Moderate aortic valve stenosis with gradient of 20/13 mmHg and valve area of 0.94 cm².  Aortic valve opening  motion is significantly decreased.  Gradient may not be accurate.  Suggest clinical correlation.  Mitral annular calcification.  Mild mitral and tricuspid and aortic regurgitation is present.  Minimal pulmonic regurgitation is present.  Mild pulmonary hypertension is present.  Left ventricular size and contractility is normal with ejection fraction of 60%.  Moderate eccentric left ventricular hypertrophy is present.  Normal left ventricular diastolic function (MV E/A ratio 1.37)  Right ventricular enlargement is present with normal contractility.  TAPSE 2.0 cm..      Cardiac catheterization  No results found for this or any previous visit.          Assessment and Plan       Diagnoses and all orders for this visit:    1. Transient ischemic attack (Primary)    2. Paroxysmal atrial fibrillation    3. Mixed hyperlipidemia    4. Moderate aortic valve stenosis    5. Essential hypertension    6. Prediabetes    7. History of non-Hodgkin's lymphoma    8. Obesity (BMI 30-39.9)    9. Aortic root aneurysm    10. B12 deficiency    11. Fatty liver    12. Acute ischemic left MCA stroke    13. Longstanding persistent atrial fibrillation         Stroke  CT head with no acute intracranial abnormalities.  CTA with no hemodynamically significant stenosis.  MRI shows small subacute infarct within left insula and tiny subacute infarct within the left caudate head and left high frontal lobe.  HANNA showed mild aortic stenosis and moderate mitral regurgitation with enlarged left atrium.  MCOT confirmed atrial fibrillation which could have contributed to stroke.  He is now on anticoagulation with warfarin.     Atrial fibrillation (paroxysmal)  Paroxysmal atrial fibrillation diagnosed with MCOT  Patient was monitored for 27 days 15 hours and 29 minutes. Predominant rhythm was sinus. Average heart rate 68 bpm, lowest 50 and highest 151 bpm. Atrial fibrillation noted with a total burden of 3%. First-degree AV block at 55 bpm. No SVT or pauses.  14% PACs and 1% PVCs. 8 beat run of VT at 151 bpm. Clinical correlation is required. Consider anticoagulation for stroke prevention in the setting of atrial fibrillation.   [id number] score is 5  Continue warfarin with goal INR of 2-3  Continue metoprolol for rate control  Dose has been reduced due to persistent bradycardia.  Heart rate today is 66     Aortic stenosis and ascending aortic aneurysm  Echocardiogram shows EF of 68%, diastolic dysfunction.  Bubble study is negative however he has calcified and restricted opening of aortic valve consistent with aortic stenosis.  CTA also showed ascending aortic aneurysm 4.2 cm.  Previous HANNA showed mild aortic stenosis and moderate mitral regurgitation.  Most recent echocardiogram from November 2024 showed gradients of 13/20 mmHg with KRISTA 0.9 cm²  Continue to follow clinically : he will eventually need HANNA again To determine planimetry KRISTA  Currently asymptomatic  I have discussed warning signs of worsening aortic stenosis.     Hypertension  Continue lisinopril and metoprolol  Blood pressure and heart rate are well-controlled  Bradycardia is new for him and MCOT showed a lowest heart rate of 50 bpm     Hyperlipidemia  LDL 58, HDL 40, total cholesterol 147.  Continue high intensity statin  LDL goal is less than 70.     Diabetes  A1c 6.2  He should be started on metformin.  We will discuss starting Jardiance.     Obesity  BMI is down from 34-31.  His weight is down from 231 to 219 pounds.  Dietary changes and exercise discussed with the patient.     Preoperative cardiovascular risk assessment  Successfully underwent left knee replacement.  He is back on warfarin

## 2025-03-31 RX ORDER — METOPROLOL SUCCINATE 25 MG/1
25 TABLET, EXTENDED RELEASE ORAL DAILY
Qty: 90 TABLET | Refills: 1 | Status: SHIPPED | OUTPATIENT
Start: 2025-03-31

## 2025-03-31 NOTE — TELEPHONE ENCOUNTER
FAILED PROTOCOL - PLEASE ADVISE    Beta-Blockers Protocol [id number]/31/2025 09:28 AM   Protocol Details   BP under 140/90 in past year    Recent or future visit with authorizing provider

## 2025-04-09 ENCOUNTER — OFFICE VISIT (OUTPATIENT)
Dept: CARDIOLOGY | Facility: CLINIC | Age: 70
End: 2025-04-09
Payer: MEDICARE

## 2025-04-09 VITALS
BODY MASS INDEX: 31.35 KG/M2 | SYSTOLIC BLOOD PRESSURE: 133 MMHG | DIASTOLIC BLOOD PRESSURE: 78 MMHG | HEIGHT: 70 IN | OXYGEN SATURATION: 99 % | WEIGHT: 219 LBS | HEART RATE: 66 BPM

## 2025-04-09 DIAGNOSIS — E53.8 B12 DEFICIENCY: ICD-10-CM

## 2025-04-09 DIAGNOSIS — G45.9 TRANSIENT ISCHEMIC ATTACK: Primary | ICD-10-CM

## 2025-04-09 DIAGNOSIS — I48.11 LONGSTANDING PERSISTENT ATRIAL FIBRILLATION: ICD-10-CM

## 2025-04-09 DIAGNOSIS — Z85.72 HISTORY OF NON-HODGKIN'S LYMPHOMA: ICD-10-CM

## 2025-04-09 DIAGNOSIS — Q25.43 AORTIC ROOT ANEURYSM: ICD-10-CM

## 2025-04-09 DIAGNOSIS — R73.03 PREDIABETES: ICD-10-CM

## 2025-04-09 DIAGNOSIS — K76.0 FATTY LIVER: ICD-10-CM

## 2025-04-09 DIAGNOSIS — E66.9 OBESITY (BMI 30-39.9): ICD-10-CM

## 2025-04-09 DIAGNOSIS — I48.0 PAROXYSMAL ATRIAL FIBRILLATION: ICD-10-CM

## 2025-04-09 DIAGNOSIS — I35.0 MODERATE AORTIC VALVE STENOSIS: ICD-10-CM

## 2025-04-09 DIAGNOSIS — E78.2 MIXED HYPERLIPIDEMIA: ICD-10-CM

## 2025-04-09 DIAGNOSIS — I10 ESSENTIAL HYPERTENSION: ICD-10-CM

## 2025-04-09 DIAGNOSIS — I63.512 ACUTE ISCHEMIC LEFT MCA STROKE: ICD-10-CM

## 2025-04-09 RX ORDER — UBIDECARENONE 75 MG
100 CAPSULE ORAL DAILY
COMMUNITY

## 2025-04-14 ENCOUNTER — ANTICOAGULATION VISIT (OUTPATIENT)
Dept: CARDIOLOGY | Facility: CLINIC | Age: 70
End: 2025-04-14
Payer: MEDICARE

## 2025-04-14 DIAGNOSIS — I48.11 LONGSTANDING PERSISTENT ATRIAL FIBRILLATION: Primary | ICD-10-CM

## 2025-04-14 DIAGNOSIS — Z79.01 LONG TERM (CURRENT) USE OF ANTICOAGULANTS: ICD-10-CM

## 2025-04-14 LAB — INR PPP: 1.8

## 2025-04-14 NOTE — PROGRESS NOTES
Patient's INR- 1.8, slightly outside of therapeutic range. This SN instructed patient to take 7.5mg on 04/15/24 and then resume current dosage. Recheck as contracted. CheriN

## 2025-04-30 ENCOUNTER — ANTICOAGULATION VISIT (OUTPATIENT)
Dept: CARDIOLOGY | Facility: CLINIC | Age: 70
End: 2025-04-30
Payer: MEDICARE

## 2025-04-30 DIAGNOSIS — Z79.01 LONG TERM (CURRENT) USE OF ANTICOAGULANTS: ICD-10-CM

## 2025-04-30 DIAGNOSIS — I48.11 LONGSTANDING PERSISTENT ATRIAL FIBRILLATION: Primary | ICD-10-CM

## 2025-04-30 LAB — INR PPP: 2.3

## 2025-05-16 ENCOUNTER — ANTICOAGULATION VISIT (OUTPATIENT)
Dept: CARDIOLOGY | Facility: CLINIC | Age: 70
End: 2025-05-16
Payer: MEDICARE

## 2025-05-16 DIAGNOSIS — I48.11 LONGSTANDING PERSISTENT ATRIAL FIBRILLATION: Primary | ICD-10-CM

## 2025-05-16 DIAGNOSIS — Z79.01 LONG TERM (CURRENT) USE OF ANTICOAGULANTS: ICD-10-CM

## 2025-05-16 LAB — INR PPP: 2.7

## 2025-05-16 NOTE — PROGRESS NOTES
Patient's INR- 2.7, within therapeutic range. Continue current dosage and recheck as contracted. CheriN

## 2025-05-27 ENCOUNTER — ANTICOAGULATION VISIT (OUTPATIENT)
Dept: CARDIOLOGY | Facility: CLINIC | Age: 70
End: 2025-05-27
Payer: MEDICARE

## 2025-05-27 DIAGNOSIS — I48.11 LONGSTANDING PERSISTENT ATRIAL FIBRILLATION: Primary | ICD-10-CM

## 2025-05-27 DIAGNOSIS — Z79.01 LONG TERM (CURRENT) USE OF ANTICOAGULANTS: ICD-10-CM

## 2025-05-27 LAB — INR PPP: 3.1

## 2025-05-27 NOTE — PROGRESS NOTES
Patient's INR- 3.1, within therapeutic range. Continue current dosage and recheck as contracted. CheriN

## 2025-06-01 DIAGNOSIS — I48.0 PAROXYSMAL ATRIAL FIBRILLATION: ICD-10-CM

## 2025-06-02 RX ORDER — WARFARIN SODIUM 5 MG/1
TABLET ORAL
Qty: 116 TABLET | Refills: 0 | Status: SHIPPED | OUTPATIENT
Start: 2025-06-02

## 2025-06-15 LAB — INR PPP: 2.8

## 2025-06-16 ENCOUNTER — ANTICOAGULATION VISIT (OUTPATIENT)
Dept: CARDIOLOGY | Facility: CLINIC | Age: 70
End: 2025-06-16
Payer: MEDICARE

## 2025-06-16 DIAGNOSIS — I48.11 LONGSTANDING PERSISTENT ATRIAL FIBRILLATION: Primary | ICD-10-CM

## 2025-06-16 DIAGNOSIS — Z79.01 LONG TERM (CURRENT) USE OF ANTICOAGULANTS: ICD-10-CM

## 2025-06-16 NOTE — PROGRESS NOTES
Patient's INR- 2.8, within therapeutic range. Continue current dosage and recheck as contracted. CheriN

## 2025-07-05 LAB — INR PPP: 2.5

## 2025-07-08 ENCOUNTER — ANTICOAGULATION VISIT (OUTPATIENT)
Dept: CARDIOLOGY | Facility: CLINIC | Age: 70
End: 2025-07-08
Payer: MEDICARE

## 2025-07-08 DIAGNOSIS — Z79.01 LONG TERM (CURRENT) USE OF ANTICOAGULANTS: ICD-10-CM

## 2025-07-08 DIAGNOSIS — I48.11 LONGSTANDING PERSISTENT ATRIAL FIBRILLATION: Primary | ICD-10-CM

## 2025-07-14 LAB — INR PPP: 2.9

## 2025-07-15 ENCOUNTER — ANTICOAGULATION VISIT (OUTPATIENT)
Dept: CARDIOLOGY | Facility: CLINIC | Age: 70
End: 2025-07-15
Payer: MEDICARE

## 2025-07-15 DIAGNOSIS — Z79.01 LONG TERM (CURRENT) USE OF ANTICOAGULANTS: ICD-10-CM

## 2025-07-15 DIAGNOSIS — I48.11 LONGSTANDING PERSISTENT ATRIAL FIBRILLATION: Primary | ICD-10-CM

## 2025-07-15 NOTE — PROGRESS NOTES
Patient's INR- 2.9, within therapeutic range. Continue current dosage and recheck as contracted. CheriN

## 2025-07-16 DIAGNOSIS — I10 ESSENTIAL HYPERTENSION: ICD-10-CM

## 2025-07-16 RX ORDER — LISINOPRIL 20 MG/1
20 TABLET ORAL DAILY
Qty: 90 TABLET | Refills: 3 | Status: SHIPPED | OUTPATIENT
Start: 2025-07-16

## 2025-07-16 NOTE — TELEPHONE ENCOUNTER
Rx Refill Note  Requested Prescriptions     Pending Prescriptions Disp Refills    lisinopril (PRINIVIL,ZESTRIL) 20 MG tablet [Pharmacy Med Name: LISINOPRIL 20MG TABLETS] 90 tablet 3     Sig: TAKE 1 TABLET BY MOUTH DAILY FOR HIGH BLOOD PRESSURE      Last office visit with prescribing clinician: 4/9/2025     Next office visit with prescribing clinician: 1/9/2026                         COMPREHENSIVE METABOLIC PANEL (05/13/2025 10:19)     Mariana Tuttle MA  07/16/25, 08:48 EDT

## 2025-07-31 LAB — INR PPP: 1.7 (ref 2–3)

## 2025-08-01 ENCOUNTER — ANTICOAGULATION VISIT (OUTPATIENT)
Dept: CARDIOLOGY | Facility: CLINIC | Age: 70
End: 2025-08-01
Payer: MEDICARE

## 2025-08-01 DIAGNOSIS — I48.11 LONGSTANDING PERSISTENT ATRIAL FIBRILLATION: Primary | ICD-10-CM

## 2025-08-01 DIAGNOSIS — Z79.01 LONG TERM (CURRENT) USE OF ANTICOAGULANTS: ICD-10-CM

## 2025-08-16 LAB — INR PPP: 2.1 (ref 2–3)

## 2025-08-18 ENCOUNTER — ANTICOAGULATION VISIT (OUTPATIENT)
Dept: CARDIOLOGY | Facility: CLINIC | Age: 70
End: 2025-08-18
Payer: MEDICARE

## 2025-08-18 DIAGNOSIS — Z79.01 LONG TERM (CURRENT) USE OF ANTICOAGULANTS: ICD-10-CM

## 2025-08-18 DIAGNOSIS — I48.11 LONGSTANDING PERSISTENT ATRIAL FIBRILLATION: Primary | ICD-10-CM

## 2025-08-20 RX ORDER — ATORVASTATIN CALCIUM 40 MG/1
TABLET, FILM COATED ORAL
Qty: 90 TABLET | Refills: 3 | Status: SHIPPED | OUTPATIENT
Start: 2025-08-20

## 2025-08-23 DIAGNOSIS — I48.0 PAROXYSMAL ATRIAL FIBRILLATION: ICD-10-CM

## 2025-08-25 RX ORDER — WARFARIN SODIUM 5 MG/1
TABLET ORAL
Qty: 116 TABLET | Refills: 0 | Status: SHIPPED | OUTPATIENT
Start: 2025-08-25